# Patient Record
Sex: MALE | Race: WHITE | NOT HISPANIC OR LATINO | URBAN - METROPOLITAN AREA
[De-identification: names, ages, dates, MRNs, and addresses within clinical notes are randomized per-mention and may not be internally consistent; named-entity substitution may affect disease eponyms.]

---

## 2017-08-03 ENCOUNTER — OUTPATIENT (OUTPATIENT)
Dept: OUTPATIENT SERVICES | Facility: HOSPITAL | Age: 72
LOS: 1 days | End: 2017-08-03
Payer: MEDICARE

## 2017-08-03 VITALS
HEIGHT: 71 IN | WEIGHT: 199.96 LBS | TEMPERATURE: 98 F | DIASTOLIC BLOOD PRESSURE: 72 MMHG | RESPIRATION RATE: 14 BRPM | SYSTOLIC BLOOD PRESSURE: 150 MMHG | HEART RATE: 75 BPM | OXYGEN SATURATION: 97 %

## 2017-08-03 DIAGNOSIS — Z01.818 ENCOUNTER FOR OTHER PREPROCEDURAL EXAMINATION: ICD-10-CM

## 2017-08-03 DIAGNOSIS — Z90.89 ACQUIRED ABSENCE OF OTHER ORGANS: Chronic | ICD-10-CM

## 2017-08-03 DIAGNOSIS — M17.11 UNILATERAL PRIMARY OSTEOARTHRITIS, RIGHT KNEE: ICD-10-CM

## 2017-08-03 DIAGNOSIS — Z98.890 OTHER SPECIFIED POSTPROCEDURAL STATES: Chronic | ICD-10-CM

## 2017-08-03 LAB
ALBUMIN SERPL ELPH-MCNC: 4 G/DL — SIGNIFICANT CHANGE UP (ref 3.3–5)
ALP SERPL-CCNC: 52 U/L — SIGNIFICANT CHANGE UP (ref 30–120)
ALT FLD-CCNC: 29 U/L DA — SIGNIFICANT CHANGE UP (ref 10–60)
ANION GAP SERPL CALC-SCNC: 9 MMOL/L — SIGNIFICANT CHANGE UP (ref 5–17)
APTT BLD: 30.6 SEC — SIGNIFICANT CHANGE UP (ref 27.5–37.4)
AST SERPL-CCNC: 20 U/L — SIGNIFICANT CHANGE UP (ref 10–40)
BILIRUB SERPL-MCNC: 0.5 MG/DL — SIGNIFICANT CHANGE UP (ref 0.2–1.2)
BLD GP AB SCN SERPL QL: SIGNIFICANT CHANGE UP
BUN SERPL-MCNC: 33 MG/DL — HIGH (ref 7–23)
CALCIUM SERPL-MCNC: 9.3 MG/DL — SIGNIFICANT CHANGE UP (ref 8.4–10.5)
CHLORIDE SERPL-SCNC: 105 MMOL/L — SIGNIFICANT CHANGE UP (ref 96–108)
CO2 SERPL-SCNC: 27 MMOL/L — SIGNIFICANT CHANGE UP (ref 22–31)
CREAT SERPL-MCNC: 0.91 MG/DL — SIGNIFICANT CHANGE UP (ref 0.5–1.3)
GLUCOSE SERPL-MCNC: 121 MG/DL — HIGH (ref 70–99)
HCT VFR BLD CALC: 38.3 % — LOW (ref 39–50)
HGB BLD-MCNC: 12.8 G/DL — LOW (ref 13–17)
INR BLD: 1.04 RATIO — SIGNIFICANT CHANGE UP (ref 0.88–1.16)
MCHC RBC-ENTMCNC: 29.8 PG — SIGNIFICANT CHANGE UP (ref 27–34)
MCHC RBC-ENTMCNC: 33.3 GM/DL — SIGNIFICANT CHANGE UP (ref 32–36)
MCV RBC AUTO: 89.5 FL — SIGNIFICANT CHANGE UP (ref 80–100)
PLATELET # BLD AUTO: 183 K/UL — SIGNIFICANT CHANGE UP (ref 150–400)
POTASSIUM SERPL-MCNC: 3.7 MMOL/L — SIGNIFICANT CHANGE UP (ref 3.5–5.3)
POTASSIUM SERPL-SCNC: 3.7 MMOL/L — SIGNIFICANT CHANGE UP (ref 3.5–5.3)
PROT SERPL-MCNC: 7.4 G/DL — SIGNIFICANT CHANGE UP (ref 6–8.3)
PROTHROM AB SERPL-ACNC: 11.4 SEC — SIGNIFICANT CHANGE UP (ref 9.8–12.7)
RBC # BLD: 4.28 M/UL — SIGNIFICANT CHANGE UP (ref 4.2–5.8)
RBC # FLD: 12.1 % — SIGNIFICANT CHANGE UP (ref 10.3–14.5)
SODIUM SERPL-SCNC: 141 MMOL/L — SIGNIFICANT CHANGE UP (ref 135–145)
WBC # BLD: 6.4 K/UL — SIGNIFICANT CHANGE UP (ref 3.8–10.5)
WBC # FLD AUTO: 6.4 K/UL — SIGNIFICANT CHANGE UP (ref 3.8–10.5)

## 2017-08-03 PROCEDURE — 85610 PROTHROMBIN TIME: CPT

## 2017-08-03 PROCEDURE — 87641 MR-STAPH DNA AMP PROBE: CPT

## 2017-08-03 PROCEDURE — 86900 BLOOD TYPING SEROLOGIC ABO: CPT

## 2017-08-03 PROCEDURE — 85027 COMPLETE CBC AUTOMATED: CPT

## 2017-08-03 PROCEDURE — 86901 BLOOD TYPING SEROLOGIC RH(D): CPT

## 2017-08-03 PROCEDURE — 93010 ELECTROCARDIOGRAM REPORT: CPT | Mod: NC

## 2017-08-03 PROCEDURE — 87640 STAPH A DNA AMP PROBE: CPT

## 2017-08-03 PROCEDURE — 36415 COLL VENOUS BLD VENIPUNCTURE: CPT

## 2017-08-03 PROCEDURE — 93005 ELECTROCARDIOGRAM TRACING: CPT

## 2017-08-03 PROCEDURE — 80053 COMPREHEN METABOLIC PANEL: CPT

## 2017-08-03 PROCEDURE — 86850 RBC ANTIBODY SCREEN: CPT

## 2017-08-03 PROCEDURE — G0463: CPT

## 2017-08-03 PROCEDURE — 85730 THROMBOPLASTIN TIME PARTIAL: CPT

## 2017-08-03 NOTE — H&P PST ADULT - PSH
H/O left knee surgery  2007  History of hemorrhoidectomy  2012  History of tonsillectomy  childhood  Hx of arthroscopy of shoulder  left, 2003

## 2017-08-03 NOTE — H&P PST ADULT - PMH
BPH (benign prostatic hyperplasia)    Dyslipidemia    GERD (gastroesophageal reflux disease)    Hypertension    Osteoarthritis of right knee

## 2017-08-03 NOTE — H&P PST ADULT - NEGATIVE ENMT SYMPTOMS
no throat pain/no nasal discharge/no dry mouth/no tinnitus/no ear pain/no sinus symptoms/no nasal obstruction/no nasal congestion/no vertigo/no post-nasal discharge/no dysphagia/no gum bleeding/no hearing difficulty/no nose bleeds/no recurrent cold sores/no abnormal taste sensation

## 2017-08-03 NOTE — H&P PST ADULT - HISTORY OF PRESENT ILLNESS
73 yo male is scheduled for "RIGHT total knee replacement" on 8/22/17 with Lalito Rojas MD.  Patient complains of right knee pain for "many years" for which has tried HA injections without relief.  Pain is worst with stair climbing and weight bearing and rates 3/10.

## 2017-08-03 NOTE — H&P PST ADULT - NEGATIVE NEUROLOGICAL SYMPTOMS
no weakness/no loss of sensation/no tremors/no difficulty walking/no vertigo/no headache/no confusion

## 2017-08-03 NOTE — H&P PST ADULT - MUSCULOSKELETAL
details… detailed exam no joint warmth/no joint erythema/no calf tenderness/decreased ROM due to pain/no joint swelling

## 2017-08-03 NOTE — H&P PST ADULT - PROBLEM SELECTOR PLAN 1
"Right total knee replacement" on 8/22/17  Pre op instructions were reviewed and signed.  Patient will obtain medical and cardiology clearance.

## 2017-08-04 LAB
MRSA PCR RESULT.: SIGNIFICANT CHANGE UP
S AUREUS DNA NOSE QL NAA+PROBE: SIGNIFICANT CHANGE UP

## 2017-08-05 ENCOUNTER — TRANSCRIPTION ENCOUNTER (OUTPATIENT)
Age: 72
End: 2017-08-05

## 2017-08-21 RX ORDER — SENNA PLUS 8.6 MG/1
2 TABLET ORAL AT BEDTIME
Qty: 0 | Refills: 0 | Status: DISCONTINUED | OUTPATIENT
Start: 2017-08-22 | End: 2017-08-25

## 2017-08-21 RX ORDER — MAGNESIUM HYDROXIDE 400 MG/1
30 TABLET, CHEWABLE ORAL DAILY
Qty: 0 | Refills: 0 | Status: DISCONTINUED | OUTPATIENT
Start: 2017-08-22 | End: 2017-08-25

## 2017-08-21 RX ORDER — PANTOPRAZOLE SODIUM 20 MG/1
40 TABLET, DELAYED RELEASE ORAL
Qty: 0 | Refills: 0 | Status: DISCONTINUED | OUTPATIENT
Start: 2017-08-22 | End: 2017-08-25

## 2017-08-21 RX ORDER — POLYETHYLENE GLYCOL 3350 17 G/17G
17 POWDER, FOR SOLUTION ORAL DAILY
Qty: 0 | Refills: 0 | Status: DISCONTINUED | OUTPATIENT
Start: 2017-08-22 | End: 2017-08-25

## 2017-08-21 RX ORDER — DOCUSATE SODIUM 100 MG
100 CAPSULE ORAL THREE TIMES A DAY
Qty: 0 | Refills: 0 | Status: DISCONTINUED | OUTPATIENT
Start: 2017-08-22 | End: 2017-08-25

## 2017-08-21 RX ORDER — SODIUM CHLORIDE 9 MG/ML
1000 INJECTION, SOLUTION INTRAVENOUS
Qty: 0 | Refills: 0 | Status: DISCONTINUED | OUTPATIENT
Start: 2017-08-22 | End: 2017-08-25

## 2017-08-21 RX ORDER — ONDANSETRON 8 MG/1
4 TABLET, FILM COATED ORAL EVERY 6 HOURS
Qty: 0 | Refills: 0 | Status: DISCONTINUED | OUTPATIENT
Start: 2017-08-22 | End: 2017-08-25

## 2017-08-22 ENCOUNTER — RESULT REVIEW (OUTPATIENT)
Age: 72
End: 2017-08-22

## 2017-08-22 ENCOUNTER — INPATIENT (INPATIENT)
Facility: HOSPITAL | Age: 72
LOS: 2 days | Discharge: HOME | DRG: 470 | End: 2017-08-25
Attending: SPECIALIST | Admitting: SPECIALIST
Payer: MEDICARE

## 2017-08-22 ENCOUNTER — TRANSCRIPTION ENCOUNTER (OUTPATIENT)
Age: 72
End: 2017-08-22

## 2017-08-22 VITALS
SYSTOLIC BLOOD PRESSURE: 144 MMHG | HEIGHT: 71 IN | DIASTOLIC BLOOD PRESSURE: 71 MMHG | TEMPERATURE: 98 F | OXYGEN SATURATION: 98 % | HEART RATE: 71 BPM | WEIGHT: 197.09 LBS | RESPIRATION RATE: 15 BRPM

## 2017-08-22 DIAGNOSIS — M17.11 UNILATERAL PRIMARY OSTEOARTHRITIS, RIGHT KNEE: ICD-10-CM

## 2017-08-22 DIAGNOSIS — Z47.1 AFTERCARE FOLLOWING JOINT REPLACEMENT SURGERY: ICD-10-CM

## 2017-08-22 DIAGNOSIS — Z98.890 OTHER SPECIFIED POSTPROCEDURAL STATES: Chronic | ICD-10-CM

## 2017-08-22 DIAGNOSIS — Z01.818 ENCOUNTER FOR OTHER PREPROCEDURAL EXAMINATION: ICD-10-CM

## 2017-08-22 DIAGNOSIS — I10 ESSENTIAL (PRIMARY) HYPERTENSION: ICD-10-CM

## 2017-08-22 DIAGNOSIS — E78.5 HYPERLIPIDEMIA, UNSPECIFIED: ICD-10-CM

## 2017-08-22 DIAGNOSIS — K21.9 GASTRO-ESOPHAGEAL REFLUX DISEASE WITHOUT ESOPHAGITIS: ICD-10-CM

## 2017-08-22 DIAGNOSIS — Z90.89 ACQUIRED ABSENCE OF OTHER ORGANS: Chronic | ICD-10-CM

## 2017-08-22 LAB
ABO RH CONFIRMATION: SIGNIFICANT CHANGE UP
ANION GAP SERPL CALC-SCNC: 8 MMOL/L — SIGNIFICANT CHANGE UP (ref 5–17)
BUN SERPL-MCNC: 18 MG/DL — SIGNIFICANT CHANGE UP (ref 7–23)
CALCIUM SERPL-MCNC: 9 MG/DL — SIGNIFICANT CHANGE UP (ref 8.4–10.5)
CHLORIDE SERPL-SCNC: 107 MMOL/L — SIGNIFICANT CHANGE UP (ref 96–108)
CO2 SERPL-SCNC: 28 MMOL/L — SIGNIFICANT CHANGE UP (ref 22–31)
CREAT SERPL-MCNC: 0.76 MG/DL — SIGNIFICANT CHANGE UP (ref 0.5–1.3)
GLUCOSE SERPL-MCNC: 161 MG/DL — HIGH (ref 70–99)
HCT VFR BLD CALC: 36.4 % — LOW (ref 39–50)
HGB BLD-MCNC: 12.2 G/DL — LOW (ref 13–17)
POTASSIUM SERPL-MCNC: 3.4 MMOL/L — LOW (ref 3.5–5.3)
POTASSIUM SERPL-SCNC: 3.4 MMOL/L — LOW (ref 3.5–5.3)
SODIUM SERPL-SCNC: 143 MMOL/L — SIGNIFICANT CHANGE UP (ref 135–145)

## 2017-08-22 PROCEDURE — 88305 TISSUE EXAM BY PATHOLOGIST: CPT | Mod: 26

## 2017-08-22 PROCEDURE — 73562 X-RAY EXAM OF KNEE 3: CPT | Mod: 26,RT

## 2017-08-22 PROCEDURE — 99223 1ST HOSP IP/OBS HIGH 75: CPT

## 2017-08-22 PROCEDURE — 88311 DECALCIFY TISSUE: CPT | Mod: 26

## 2017-08-22 RX ORDER — CARVEDILOL PHOSPHATE 80 MG/1
40 CAPSULE, EXTENDED RELEASE ORAL DAILY
Qty: 0 | Refills: 0 | Status: DISCONTINUED | OUTPATIENT
Start: 2017-08-23 | End: 2017-08-25

## 2017-08-22 RX ORDER — CEFAZOLIN SODIUM 1 G
2000 VIAL (EA) INJECTION ONCE
Qty: 0 | Refills: 0 | Status: COMPLETED | OUTPATIENT
Start: 2017-08-22 | End: 2017-08-22

## 2017-08-22 RX ORDER — SODIUM CHLORIDE 9 MG/ML
1000 INJECTION, SOLUTION INTRAVENOUS
Qty: 0 | Refills: 0 | Status: DISCONTINUED | OUTPATIENT
Start: 2017-08-22 | End: 2017-08-22

## 2017-08-22 RX ORDER — ACETAMINOPHEN 500 MG
1000 TABLET ORAL EVERY 8 HOURS
Qty: 0 | Refills: 0 | Status: DISCONTINUED | OUTPATIENT
Start: 2017-08-23 | End: 2017-08-25

## 2017-08-22 RX ORDER — HYDRALAZINE HCL 50 MG
50 TABLET ORAL
Qty: 0 | Refills: 0 | Status: DISCONTINUED | OUTPATIENT
Start: 2017-08-22 | End: 2017-08-25

## 2017-08-22 RX ORDER — HYDROMORPHONE HYDROCHLORIDE 2 MG/ML
0.5 INJECTION INTRAMUSCULAR; INTRAVENOUS; SUBCUTANEOUS
Qty: 0 | Refills: 0 | Status: DISCONTINUED | OUTPATIENT
Start: 2017-08-22 | End: 2017-08-25

## 2017-08-22 RX ORDER — OXYCODONE HYDROCHLORIDE 5 MG/1
5 TABLET ORAL
Qty: 0 | Refills: 0 | Status: DISCONTINUED | OUTPATIENT
Start: 2017-08-22 | End: 2017-08-25

## 2017-08-22 RX ORDER — NIFEDIPINE 30 MG
60 TABLET, EXTENDED RELEASE 24 HR ORAL DAILY
Qty: 0 | Refills: 0 | Status: DISCONTINUED | OUTPATIENT
Start: 2017-08-22 | End: 2017-08-22

## 2017-08-22 RX ORDER — HYDROMORPHONE HYDROCHLORIDE 2 MG/ML
0.5 INJECTION INTRAMUSCULAR; INTRAVENOUS; SUBCUTANEOUS
Qty: 0 | Refills: 0 | Status: DISCONTINUED | OUTPATIENT
Start: 2017-08-22 | End: 2017-08-22

## 2017-08-22 RX ORDER — TRIAMTERENE/HYDROCHLOROTHIAZID 75 MG-50MG
1 TABLET ORAL DAILY
Qty: 0 | Refills: 0 | Status: DISCONTINUED | OUTPATIENT
Start: 2017-08-24 | End: 2017-08-25

## 2017-08-22 RX ORDER — ACETAMINOPHEN 500 MG
1000 TABLET ORAL ONCE
Qty: 0 | Refills: 0 | Status: COMPLETED | OUTPATIENT
Start: 2017-08-22 | End: 2017-08-22

## 2017-08-22 RX ORDER — ASPIRIN/CALCIUM CARB/MAGNESIUM 324 MG
162 TABLET ORAL EVERY 12 HOURS
Qty: 0 | Refills: 0 | Status: DISCONTINUED | OUTPATIENT
Start: 2017-08-23 | End: 2017-08-25

## 2017-08-22 RX ORDER — APREPITANT 80 MG/1
40 CAPSULE ORAL ONCE
Qty: 0 | Refills: 0 | Status: COMPLETED | OUTPATIENT
Start: 2017-08-22 | End: 2017-08-22

## 2017-08-22 RX ORDER — NIFEDIPINE 30 MG
60 TABLET, EXTENDED RELEASE 24 HR ORAL EVERY 12 HOURS
Qty: 0 | Refills: 0 | Status: DISCONTINUED | OUTPATIENT
Start: 2017-08-22 | End: 2017-08-25

## 2017-08-22 RX ORDER — CELECOXIB 200 MG/1
200 CAPSULE ORAL
Qty: 0 | Refills: 0 | Status: DISCONTINUED | OUTPATIENT
Start: 2017-08-22 | End: 2017-08-25

## 2017-08-22 RX ORDER — TRANEXAMIC ACID 100 MG/ML
1000 INJECTION, SOLUTION INTRAVENOUS ONCE
Qty: 0 | Refills: 0 | Status: COMPLETED | OUTPATIENT
Start: 2017-08-22 | End: 2017-08-22

## 2017-08-22 RX ORDER — OXYCODONE HYDROCHLORIDE 5 MG/1
10 TABLET ORAL
Qty: 0 | Refills: 0 | Status: DISCONTINUED | OUTPATIENT
Start: 2017-08-22 | End: 2017-08-25

## 2017-08-22 RX ORDER — DOXAZOSIN MESYLATE 4 MG
4 TABLET ORAL AT BEDTIME
Qty: 0 | Refills: 0 | Status: DISCONTINUED | OUTPATIENT
Start: 2017-08-22 | End: 2017-08-25

## 2017-08-22 RX ORDER — ATORVASTATIN CALCIUM 80 MG/1
40 TABLET, FILM COATED ORAL AT BEDTIME
Qty: 0 | Refills: 0 | Status: DISCONTINUED | OUTPATIENT
Start: 2017-08-22 | End: 2017-08-25

## 2017-08-22 RX ORDER — CELECOXIB 200 MG/1
1 CAPSULE ORAL
Qty: 38 | Refills: 0 | OUTPATIENT
Start: 2017-08-22 | End: 2017-09-10

## 2017-08-22 RX ORDER — CELECOXIB 200 MG/1
200 CAPSULE ORAL ONCE
Qty: 0 | Refills: 0 | Status: COMPLETED | OUTPATIENT
Start: 2017-08-22 | End: 2017-08-22

## 2017-08-22 RX ORDER — CEFAZOLIN SODIUM 1 G
2000 VIAL (EA) INJECTION EVERY 8 HOURS
Qty: 0 | Refills: 0 | Status: COMPLETED | OUTPATIENT
Start: 2017-08-22 | End: 2017-08-23

## 2017-08-22 RX ORDER — ACETAMINOPHEN 500 MG
1000 TABLET ORAL EVERY 6 HOURS
Qty: 0 | Refills: 0 | Status: COMPLETED | OUTPATIENT
Start: 2017-08-22 | End: 2017-08-23

## 2017-08-22 RX ORDER — VALSARTAN 80 MG/1
320 TABLET ORAL DAILY
Qty: 0 | Refills: 0 | Status: DISCONTINUED | OUTPATIENT
Start: 2017-08-24 | End: 2017-08-25

## 2017-08-22 RX ADMIN — Medication 1000 MILLIGRAM(S): at 22:00

## 2017-08-22 RX ADMIN — CELECOXIB 200 MILLIGRAM(S): 200 CAPSULE ORAL at 07:25

## 2017-08-22 RX ADMIN — SODIUM CHLORIDE 75 MILLILITER(S): 9 INJECTION, SOLUTION INTRAVENOUS at 16:06

## 2017-08-22 RX ADMIN — Medication 400 MILLIGRAM(S): at 16:06

## 2017-08-22 RX ADMIN — CELECOXIB 200 MILLIGRAM(S): 200 CAPSULE ORAL at 18:08

## 2017-08-22 RX ADMIN — Medication 100 MILLIGRAM(S): at 16:31

## 2017-08-22 RX ADMIN — SODIUM CHLORIDE 75 MILLILITER(S): 9 INJECTION, SOLUTION INTRAVENOUS at 21:19

## 2017-08-22 RX ADMIN — CELECOXIB 200 MILLIGRAM(S): 200 CAPSULE ORAL at 17:39

## 2017-08-22 RX ADMIN — Medication 4 MILLIGRAM(S): at 21:19

## 2017-08-22 RX ADMIN — Medication 400 MILLIGRAM(S): at 21:19

## 2017-08-22 RX ADMIN — HYDROMORPHONE HYDROCHLORIDE 0.5 MILLIGRAM(S): 2 INJECTION INTRAMUSCULAR; INTRAVENOUS; SUBCUTANEOUS at 10:39

## 2017-08-22 RX ADMIN — Medication 50 MILLIGRAM(S): at 17:39

## 2017-08-22 RX ADMIN — OXYCODONE HYDROCHLORIDE 5 MILLIGRAM(S): 5 TABLET ORAL at 18:12

## 2017-08-22 RX ADMIN — Medication 1000 MILLIGRAM(S): at 16:40

## 2017-08-22 RX ADMIN — ATORVASTATIN CALCIUM 40 MILLIGRAM(S): 80 TABLET, FILM COATED ORAL at 21:19

## 2017-08-22 RX ADMIN — Medication 60 MILLIGRAM(S): at 21:19

## 2017-08-22 RX ADMIN — APREPITANT 40 MILLIGRAM(S): 80 CAPSULE ORAL at 07:25

## 2017-08-22 NOTE — CONSULT NOTE ADULT - SUBJECTIVE AND OBJECTIVE BOX
Patient is a 72y old  Male who presents with a chief complaint of "Dr Rojas is going to replace my right knee" (21 Aug 2017 15:50)      HPI:  Pt is seen and examined.    PAST MEDICAL & SURGICAL HISTORY:  BPH (benign prostatic hyperplasia)  Osteoarthritis of right knee  Dyslipidemia  GERD (gastroesophageal reflux disease)  Hypertension  History of tonsillectomy: childhood  History of hemorrhoidectomy: 2012  Hx of arthroscopy of shoulder: left, 2003  H/O left knee surgery: 2007      MEDICATIONS  (STANDING):  lactated ringers. 1000 milliLiter(s) (75 mL/Hr) IV Continuous <Continuous>  pantoprazole    Tablet 40 milliGRAM(s) Oral before breakfast  senna 2 Tablet(s) Oral at bedtime  docusate sodium 100 milliGRAM(s) Oral three times a day  celecoxib 200 milliGRAM(s) Oral two times a day with meals  doxazosin 4 milliGRAM(s) Oral at bedtime  atorvastatin 40 milliGRAM(s) Oral at bedtime  NIFEdipine XL 60 milliGRAM(s) Oral daily  hydrALAZINE 50 milliGRAM(s) Oral two times a day  ceFAZolin   IVPB 2000 milliGRAM(s) IV Intermittent every 8 hours  acetaminophen  IVPB. 1000 milliGRAM(s) IV Intermittent every 6 hours  carvedilol CR 40 milliGRAM(s) Oral daily    MEDICATIONS  (PRN):  aluminum hydroxide/magnesium hydroxide/simethicone Suspension 30 milliLiter(s) Oral four times a day PRN Indigestion  ondansetron Injectable 4 milliGRAM(s) IV Push every 6 hours PRN Nausea and/or Vomiting  magnesium hydroxide Suspension 30 milliLiter(s) Oral daily PRN Constipation  polyethylene glycol 3350 17 Gram(s) Oral daily PRN Constipation  oxyCODONE    IR 5 milliGRAM(s) Oral every 3 hours PRN Mild Pain  oxyCODONE    IR 10 milliGRAM(s) Oral every 3 hours PRN Moderate Pain  HYDROmorphone  Injectable 0.5 milliGRAM(s) IV Push every 3 hours PRN Severe Pain      Allergies    No Known Allergies    Intolerances          SOCIAL HISTORY:  Smoker:  YES / NO        PACK YEARS:                         WHEN QUIT?  ETOH use:  YES / NO               FREQUENCY / QUANTITY:  Ilicit Drug use:  YES / NO  Occupation:  Assisted device use (Cane / Walker):  Live with:      FAMILY HISTORY:  Family history of heart disease (Father)      Vital Signs Last 24 Hrs  T(C): 36.8 (22 Aug 2017 12:27), Max: 36.8 (22 Aug 2017 12:27)  T(F): 98.3 (22 Aug 2017 12:27), Max: 98.3 (22 Aug 2017 12:27)  HR: 68 (22 Aug 2017 12:27) (55 - 71)  BP: 120/61 (22 Aug 2017 12:27) (86/62 - 156/78)  BP(mean): --  RR: 16 (22 Aug 2017 12:27) (9 - 16)  SpO2: 96% (22 Aug 2017 12:27) (96% - 100%)            LABS:              CAPILLARY BLOOD GLUCOSE          RADIOLOGY & ADDITIONAL STUDIES: Patient is a 72y old  Male who presents with a chief complaint of "Dr Rojas is going to replace my right knee" (21 Aug 2017 15:50)      HPI:  Pt is seen and examined.    PAST MEDICAL & SURGICAL HISTORY:  BPH (benign prostatic hyperplasia)  Osteoarthritis of right knee  Dyslipidemia  GERD (gastroesophageal reflux disease)  Hypertension  History of tonsillectomy: childhood  History of hemorrhoidectomy: 2012  Hx of arthroscopy of shoulder: left, 2003  H/O left knee surgery: 2007      MEDICATIONS  (STANDING):  lactated ringers. 1000 milliLiter(s) (75 mL/Hr) IV Continuous <Continuous>  pantoprazole    Tablet 40 milliGRAM(s) Oral before breakfast  senna 2 Tablet(s) Oral at bedtime  docusate sodium 100 milliGRAM(s) Oral three times a day  celecoxib 200 milliGRAM(s) Oral two times a day with meals  doxazosin 4 milliGRAM(s) Oral at bedtime  atorvastatin 40 milliGRAM(s) Oral at bedtime  NIFEdipine XL 60 milliGRAM(s) Oral daily  hydrALAZINE 50 milliGRAM(s) Oral two times a day  ceFAZolin   IVPB 2000 milliGRAM(s) IV Intermittent every 8 hours  acetaminophen  IVPB. 1000 milliGRAM(s) IV Intermittent every 6 hours  carvedilol CR 40 milliGRAM(s) Oral daily    MEDICATIONS  (PRN):  aluminum hydroxide/magnesium hydroxide/simethicone Suspension 30 milliLiter(s) Oral four times a day PRN Indigestion  ondansetron Injectable 4 milliGRAM(s) IV Push every 6 hours PRN Nausea and/or Vomiting  magnesium hydroxide Suspension 30 milliLiter(s) Oral daily PRN Constipation  polyethylene glycol 3350 17 Gram(s) Oral daily PRN Constipation  oxyCODONE    IR 5 milliGRAM(s) Oral every 3 hours PRN Mild Pain  oxyCODONE    IR 10 milliGRAM(s) Oral every 3 hours PRN Moderate Pain  HYDROmorphone  Injectable 0.5 milliGRAM(s) IV Push every 3 hours PRN Severe Pain      Allergies    No Known Allergies    Intolerances          SOCIAL HISTORY:  Smoker:  YES / NO        PACK YEARS:                         WHEN QUIT?  ETOH use:  YES / NO               FREQUENCY / QUANTITY:  Ilicit Drug use:  YES / NO  Occupation:  Assisted device use (Cane / Walker):  Live with:      FAMILY HISTORY:  Family history of heart disease (Father)      Vital Signs Last 24 Hrs  T(C): 36.8 (22 Aug 2017 12:27), Max: 36.8 (22 Aug 2017 12:27)  T(F): 98.3 (22 Aug 2017 12:27), Max: 98.3 (22 Aug 2017 12:27)  HR: 68 (22 Aug 2017 12:27) (55 - 71)  BP: 120/61 (22 Aug 2017 12:27) (86/62 - 156/78)  BP(mean): --  RR: 16 (22 Aug 2017 12:27) (9 - 16)  SpO2: 96% (22 Aug 2017 12:27) (96% - 100%)

## 2017-08-22 NOTE — CONSULT NOTE ADULT - PROBLEM SELECTOR RECOMMENDATION 9
pain meds as-oxycodone.  PT/OT.  DVT prophylaxis.  DVT ppx: [ ]ASA81 [ x] WXA202 [ ] Lovenox [ ] Coumadin   [ ] Eliquis [  ] Heparin  Dispo:     Home [ ]     Acute Rehab [ ]     SANDI [ ]     TBD [x ]

## 2017-08-22 NOTE — PHYSICAL THERAPY INITIAL EVALUATION ADULT - GAIT TRAINING, PT EVAL
Goals 2-4 days, Pt will ambulate 150 ft w/ rolling walker independently.     Pt will negotiate 10 steps with rail and straight cane with supervision

## 2017-08-22 NOTE — PHYSICAL THERAPY INITIAL EVALUATION ADULT - ADDITIONAL COMMENTS
Pt lives with wife in a house w/ 5 steps to enter with rail and 15 steps inside with rail.  Pt has no DME

## 2017-08-22 NOTE — OCCUPATIONAL THERAPY INITIAL EVALUATION ADULT - ADDITIONAL COMMENTS
Pt lives in  a private house 5 [address] + railing and 15 inside + railing + stall shower with built in seat and grab bar. Pt has a grab bar by toilet

## 2017-08-22 NOTE — PROGRESS NOTE ADULT - SUBJECTIVE AND OBJECTIVE BOX
Post Op     VAL GOODMAN      72y        Male                                                                                                                 T(C): 36.8 (08-22-17 @ 12:27), Max: 36.8 (08-22-17 @ 12:27)  HR: 68 (08-22-17 @ 12:27) (55 - 71)  BP: 120/61 (08-22-17 @ 12:27) (86/62 - 156/78)  RR: 16 (08-22-17 @ 12:27) (9 - 16)  SpO2: 96% (08-22-17 @ 12:27) (96% - 100%)      S/P  total knee replacement    Patient denies shortness of breath, chest pain, dyspnea, No complaints  Pain is 3 /10    Physical Exam    Extremity: Bilaterally:  No holmon                                           No Cord                                          PAS on                                          Neurovascular intact                                          Motor intact EHL/FHL                                          Sensation intact                                          Pulses intact DP/PT                                         Calves Soft                                         Dressing Clean / Dry / Intact                                         Capillary refill with 5 seconds                A/P  -- S/P total knee replacement    -  Medicine To Follow   - DVT prophylaxis PAS / aspirin   - PT & OT   - Analgesia  - Incentive Spirometry  - Discharge Planning  - Safety Precautions  -  CBC , BMP daily

## 2017-08-22 NOTE — PHYSICAL THERAPY INITIAL EVALUATION ADULT - RANGE OF MOTION EXAMINATION, REHAB EVAL
Left LE ROM was WFL (within functional limits)/R knee 0-60 deg/bilateral upper extremity ROM was WFL (within functional limits)/deficits as listed below

## 2017-08-22 NOTE — PHYSICAL THERAPY INITIAL EVALUATION ADULT - GAIT DEVIATIONS NOTED, PT EVAL
decreased nhi/decreased velocity of limb motion/decreased weight-shifting ability/decreased step length

## 2017-08-23 ENCOUNTER — TRANSCRIPTION ENCOUNTER (OUTPATIENT)
Age: 72
End: 2017-08-23

## 2017-08-23 DIAGNOSIS — D69.6 THROMBOCYTOPENIA, UNSPECIFIED: ICD-10-CM

## 2017-08-23 DIAGNOSIS — D72.828 OTHER ELEVATED WHITE BLOOD CELL COUNT: ICD-10-CM

## 2017-08-23 DIAGNOSIS — D50.0 IRON DEFICIENCY ANEMIA SECONDARY TO BLOOD LOSS (CHRONIC): ICD-10-CM

## 2017-08-23 LAB
ANION GAP SERPL CALC-SCNC: 9 MMOL/L — SIGNIFICANT CHANGE UP (ref 5–17)
BUN SERPL-MCNC: 15 MG/DL — SIGNIFICANT CHANGE UP (ref 7–23)
CALCIUM SERPL-MCNC: 8.6 MG/DL — SIGNIFICANT CHANGE UP (ref 8.4–10.5)
CHLORIDE SERPL-SCNC: 109 MMOL/L — HIGH (ref 96–108)
CO2 SERPL-SCNC: 25 MMOL/L — SIGNIFICANT CHANGE UP (ref 22–31)
CREAT SERPL-MCNC: 0.84 MG/DL — SIGNIFICANT CHANGE UP (ref 0.5–1.3)
GLUCOSE SERPL-MCNC: 147 MG/DL — HIGH (ref 70–99)
HCT VFR BLD CALC: 31.9 % — LOW (ref 39–50)
HGB BLD-MCNC: 10.9 G/DL — LOW (ref 13–17)
MCHC RBC-ENTMCNC: 32.1 PG — SIGNIFICANT CHANGE UP (ref 27–34)
MCHC RBC-ENTMCNC: 34.2 GM/DL — SIGNIFICANT CHANGE UP (ref 32–36)
MCV RBC AUTO: 93.7 FL — SIGNIFICANT CHANGE UP (ref 80–100)
PLATELET # BLD AUTO: 144 K/UL — LOW (ref 150–400)
POTASSIUM SERPL-MCNC: 3.5 MMOL/L — SIGNIFICANT CHANGE UP (ref 3.5–5.3)
POTASSIUM SERPL-SCNC: 3.5 MMOL/L — SIGNIFICANT CHANGE UP (ref 3.5–5.3)
RBC # BLD: 3.4 M/UL — LOW (ref 4.2–5.8)
RBC # FLD: 12.2 % — SIGNIFICANT CHANGE UP (ref 10.3–14.5)
SODIUM SERPL-SCNC: 143 MMOL/L — SIGNIFICANT CHANGE UP (ref 135–145)
WBC # BLD: 12.2 K/UL — HIGH (ref 3.8–10.5)
WBC # FLD AUTO: 12.2 K/UL — HIGH (ref 3.8–10.5)

## 2017-08-23 PROCEDURE — 99233 SBSQ HOSP IP/OBS HIGH 50: CPT

## 2017-08-23 RX ORDER — POTASSIUM CHLORIDE 20 MEQ
20 PACKET (EA) ORAL ONCE
Qty: 0 | Refills: 0 | Status: COMPLETED | OUTPATIENT
Start: 2017-08-23 | End: 2017-08-23

## 2017-08-23 RX ADMIN — Medication 60 MILLIGRAM(S): at 05:07

## 2017-08-23 RX ADMIN — Medication 1000 MILLIGRAM(S): at 05:24

## 2017-08-23 RX ADMIN — Medication 162 MILLIGRAM(S): at 21:14

## 2017-08-23 RX ADMIN — PANTOPRAZOLE SODIUM 40 MILLIGRAM(S): 20 TABLET, DELAYED RELEASE ORAL at 05:07

## 2017-08-23 RX ADMIN — OXYCODONE HYDROCHLORIDE 5 MILLIGRAM(S): 5 TABLET ORAL at 05:07

## 2017-08-23 RX ADMIN — Medication 100 MILLIGRAM(S): at 14:17

## 2017-08-23 RX ADMIN — Medication 1000 MILLIGRAM(S): at 10:37

## 2017-08-23 RX ADMIN — CELECOXIB 200 MILLIGRAM(S): 200 CAPSULE ORAL at 17:30

## 2017-08-23 RX ADMIN — Medication 1000 MILLIGRAM(S): at 11:00

## 2017-08-23 RX ADMIN — Medication 1000 MILLIGRAM(S): at 17:30

## 2017-08-23 RX ADMIN — CELECOXIB 200 MILLIGRAM(S): 200 CAPSULE ORAL at 09:05

## 2017-08-23 RX ADMIN — Medication 50 MILLIGRAM(S): at 17:31

## 2017-08-23 RX ADMIN — Medication 50 MILLIGRAM(S): at 05:07

## 2017-08-23 RX ADMIN — CELECOXIB 200 MILLIGRAM(S): 200 CAPSULE ORAL at 08:25

## 2017-08-23 RX ADMIN — Medication 100 MILLIGRAM(S): at 08:25

## 2017-08-23 RX ADMIN — CELECOXIB 200 MILLIGRAM(S): 200 CAPSULE ORAL at 18:00

## 2017-08-23 RX ADMIN — Medication 60 MILLIGRAM(S): at 17:31

## 2017-08-23 RX ADMIN — Medication 162 MILLIGRAM(S): at 08:25

## 2017-08-23 RX ADMIN — Medication 400 MILLIGRAM(S): at 05:06

## 2017-08-23 RX ADMIN — OXYCODONE HYDROCHLORIDE 5 MILLIGRAM(S): 5 TABLET ORAL at 18:24

## 2017-08-23 RX ADMIN — Medication 100 MILLIGRAM(S): at 00:21

## 2017-08-23 RX ADMIN — OXYCODONE HYDROCHLORIDE 5 MILLIGRAM(S): 5 TABLET ORAL at 05:55

## 2017-08-23 RX ADMIN — ATORVASTATIN CALCIUM 40 MILLIGRAM(S): 80 TABLET, FILM COATED ORAL at 21:15

## 2017-08-23 RX ADMIN — SENNA PLUS 2 TABLET(S): 8.6 TABLET ORAL at 21:16

## 2017-08-23 RX ADMIN — Medication 1000 MILLIGRAM(S): at 18:00

## 2017-08-23 RX ADMIN — Medication 4 MILLIGRAM(S): at 21:15

## 2017-08-23 RX ADMIN — Medication 100 MILLIGRAM(S): at 21:15

## 2017-08-23 RX ADMIN — Medication 20 MILLIEQUIVALENT(S): at 08:54

## 2017-08-23 NOTE — DISCHARGE NOTE ADULT - DURABLE MEDICAL EQUIPMENT AGENCY
[facility]  Tel.# [phone] for DME for Rolling Walker and CPM Machine

## 2017-08-23 NOTE — DISCHARGE NOTE ADULT - CARE PLAN
Principal Discharge DX:	Aftercare following right knee joint replacement surgery  Goal:	Improve ambulation, ADLs and quality of life  Instructions for follow-up, activity and diet:	Physical Therapy/Occupational Therapy for: ambulation, transfers, stairs, ADL's (activities of daily living), range of motion exercises, and isometrics  -Activity  • Weight Bearing as tolerated with rolling walker.  • Take short, frequent walks increasing the distance that you walk each day as tolerated.  • Change your position every hour to decrease pain and stiffness.  • Continue the exercises taught to you by your physical therapist.  • No driving until cleared by the doctor.  • No tub baths, hot tubs, or swimming pools until instructed by your doctor.  • Do not squat down on the floor.• Do not kneel or twist your knee.  • Range of Motion Goals: Flexion= 120 degrees, Extension = 0 degrees  Keep incision clean and dry. May shower 5 days after surgery if no drainage from incision.  Prineo removal 2 weeks after surgery at Surgeon's office.  - Use CPM 2 hours 2 times a day- Start daily @ 6AM  - Advance 5-10degrees each session as tolerated  to goal 120 degrees  - Consider Pain meds prior to CPM-  Apply ice to knee Post CPM  Instructions for follow-up, activity and diet:	- Call your doctor if you experience:  • An increase in pain not controlled by pain medication or change in activity or  position.  • Temperature greater than 101° F.  • Redness, increased swelling or foul smelling drainage from or around the  incision.  • Numbness, tingling or a change in color or temperature of the operative leg.  • Call your doctor immediately if you experience chest pain, shortness of breath or calf pain. Principal Discharge DX:	Aftercare following right knee joint replacement surgery  Goal:	Improve ambulation, ADLs and quality of life  Instructions for follow-up, activity and diet:	Physical Therapy/Occupational Therapy for: ambulation, transfers, stairs, ADL's (activities of daily living), range of motion exercises, and isometrics  -Activity  • Weight Bearing as tolerated with rolling walker.  • Take short, frequent walks increasing the distance that you walk each day as tolerated.  • Change your position every hour to decrease pain and stiffness.  • Continue the exercises taught to you by your physical therapist.  • No driving until cleared by the doctor.  • No tub baths, hot tubs, or swimming pools until instructed by your doctor.  • Do not squat down on the floor.• Do not kneel or twist your knee.  • Range of Motion Goals: Flexion= 110 degrees, Extension = 0 degrees  Keep incision clean and dry. May shower  if no drainage from knee incision.  Prineo tape removal 2 weeks after surgery at Surgeon's office.  - Use CPM 2 hours 2 times a day- Start daily @ 6AM  - Advance 5-10degrees each session as tolerated  to goal 110 degrees  - Consider Pain meds prior to CPM-  Apply ice to knee after CPM use.  Instructions for follow-up, activity and diet:	- Call your doctor if you experience:  • An increase in pain not controlled by pain medication or change in activity or  position.  • Temperature greater than 101° F.  • Redness, increased swelling or foul smelling drainage from or around the  incision.  • Numbness, tingling or a change in color or temperature of the operative leg.  • Call your doctor immediately if you experience chest pain, shortness of breath or calf pain.

## 2017-08-23 NOTE — DISCHARGE NOTE ADULT - NS AS ACTIVITY OBS
No Heavy lifting/straining/Showering allowed/Stairs allowed/Walking-Outdoors allowed/Do not drive or operate machinery/Walking-Indoors allowed Walking-Outdoors allowed/Do not drive or operate machinery/Showering allowed/Stairs allowed/Walking-Indoors allowed/may shower when knee wound dry

## 2017-08-23 NOTE — PROGRESS NOTE ADULT - PROBLEM SELECTOR PLAN 1
oxycodone.  PT/OT.  DVT prophylaxis.  DVT ppx: [ ]ASA81 [ x] CUI709 [ ] Lovenox [ ] Coumadin   [ ] Eliquis [  ] Heparin  Dispo:     Home [ ]     Acute Rehab [ ]     SANDI [ ]     TBD [x ].

## 2017-08-23 NOTE — PROGRESS NOTE ADULT - SUBJECTIVE AND OBJECTIVE BOX
Discharge medication calendar:  ASA EC 162mg q12h x 6 weeks  APAP 1000mg q8h x 2-3 weeks  Celecoxib 200mg q12h x 2-3 weeks  Pantoprazole 40mg QAM x 6 weeks  Oxycodone PRN  Docusate 100mg TID while taking narcotic  Senna, bisacodyl, or Miralax PRN for treatment of constipation Discharge medication calendar:  ASA EC 162mg q12h x 6 weeks  APAP 1000mg q8h x 2-3 weeks  Celecoxib 200mg q12h x 2-3 weeks  Omeprazole 40mg QAM (home medication)  Oxycodone PRN  Docusate 100mg TID while taking narcotic  Senna, bisacodyl, or Miralax PRN for treatment of constipation

## 2017-08-23 NOTE — PROGRESS NOTE ADULT - SUBJECTIVE AND OBJECTIVE BOX
Patient is a 72y old  Male who presents with a chief complaint of "Dr Rojas is going to replace my right knee" (21 Aug 2017 15:50)      INTERVAL HPI/OVERNIGHT EVENTS:  Pt is seen and examined.  pain is controlled. No new complain.    Pain Location & Control:     MEDICATIONS  (STANDING):  lactated ringers. 1000 milliLiter(s) (75 mL/Hr) IV Continuous <Continuous>  pantoprazole    Tablet 40 milliGRAM(s) Oral before breakfast  senna 2 Tablet(s) Oral at bedtime  docusate sodium 100 milliGRAM(s) Oral three times a day  celecoxib 200 milliGRAM(s) Oral two times a day with meals  aspirin enteric coated 162 milliGRAM(s) Oral every 12 hours  doxazosin 4 milliGRAM(s) Oral at bedtime  atorvastatin 40 milliGRAM(s) Oral at bedtime  hydrALAZINE 50 milliGRAM(s) Oral two times a day  acetaminophen   Tablet. 1000 milliGRAM(s) Oral every 8 hours  carvedilol CR 40 milliGRAM(s) Oral daily  NIFEdipine XL 60 milliGRAM(s) Oral every 12 hours    MEDICATIONS  (PRN):  aluminum hydroxide/magnesium hydroxide/simethicone Suspension 30 milliLiter(s) Oral four times a day PRN Indigestion  ondansetron Injectable 4 milliGRAM(s) IV Push every 6 hours PRN Nausea and/or Vomiting  magnesium hydroxide Suspension 30 milliLiter(s) Oral daily PRN Constipation  polyethylene glycol 3350 17 Gram(s) Oral daily PRN Constipation  oxyCODONE    IR 5 milliGRAM(s) Oral every 3 hours PRN Mild Pain  oxyCODONE    IR 10 milliGRAM(s) Oral every 3 hours PRN Moderate Pain  HYDROmorphone  Injectable 0.5 milliGRAM(s) IV Push every 3 hours PRN Severe Pain      Allergies    No Known Allergies    Intolerances            Vital Signs Last 24 Hrs  T(C): 36.6 (23 Aug 2017 07:32), Max: 36.9 (22 Aug 2017 15:33)  T(F): 97.8 (23 Aug 2017 07:32), Max: 98.4 (22 Aug 2017 15:33)  HR: 64 (23 Aug 2017 07:32) (55 - 86)  BP: 125/67 (23 Aug 2017 07:32) (86/62 - 137/75)  BP(mean): --  RR: 16 (23 Aug 2017 07:32) (9 - 18)  SpO2: 98% (23 Aug 2017 07:32) (96% - 100%)        LABS:                        10.9   12.2  )-----------( 144      ( 23 Aug 2017 07:11 )             31.9     23 Aug 2017 07:11    143    |  109    |  15     ----------------------------<  147    3.5     |  25     |  0.84     Ca    8.6        23 Aug 2017 07:11            RADIOLOGY & ADDITIONAL TESTS:    Imaging Personally Reviewed:  [ ] YES  [ ] NO    Consultant(s) Notes Reviewed:  [ x] YES  [ ] NO    Care Discussed with Consultants/Other Providers [ x] YES  [ ] NO

## 2017-08-23 NOTE — DISCHARGE NOTE ADULT - PLAN OF CARE
Improve ambulation, ADLs and quality of life Physical Therapy/Occupational Therapy for: ambulation, transfers, stairs, ADL's (activities of daily living), range of motion exercises, and isometrics  -Activity  • Weight Bearing as tolerated with rolling walker.  • Take short, frequent walks increasing the distance that you walk each day as tolerated.  • Change your position every hour to decrease pain and stiffness.  • Continue the exercises taught to you by your physical therapist.  • No driving until cleared by the doctor.  • No tub baths, hot tubs, or swimming pools until instructed by your doctor.  • Do not squat down on the floor.• Do not kneel or twist your knee.  • Range of Motion Goals: Flexion= 120 degrees, Extension = 0 degrees  Keep incision clean and dry. May shower 5 days after surgery if no drainage from incision.  Prineo removal 2 weeks after surgery at Surgeon's office.  - Use CPM 2 hours 2 times a day- Start daily @ 6AM  - Advance 5-10degrees each session as tolerated  to goal 120 degrees  - Consider Pain meds prior to CPM-  Apply ice to knee Post CPM - Call your doctor if you experience:  • An increase in pain not controlled by pain medication or change in activity or  position.  • Temperature greater than 101° F.  • Redness, increased swelling or foul smelling drainage from or around the  incision.  • Numbness, tingling or a change in color or temperature of the operative leg.  • Call your doctor immediately if you experience chest pain, shortness of breath or calf pain. Physical Therapy/Occupational Therapy for: ambulation, transfers, stairs, ADL's (activities of daily living), range of motion exercises, and isometrics  -Activity  • Weight Bearing as tolerated with rolling walker.  • Take short, frequent walks increasing the distance that you walk each day as tolerated.  • Change your position every hour to decrease pain and stiffness.  • Continue the exercises taught to you by your physical therapist.  • No driving until cleared by the doctor.  • No tub baths, hot tubs, or swimming pools until instructed by your doctor.  • Do not squat down on the floor.• Do not kneel or twist your knee.  • Range of Motion Goals: Flexion= 110 degrees, Extension = 0 degrees  Keep incision clean and dry. May shower  if no drainage from knee incision.  Prineo tape removal 2 weeks after surgery at Surgeon's office.  - Use CPM 2 hours 2 times a day- Start daily @ 6AM  - Advance 5-10degrees each session as tolerated  to goal 110 degrees  - Consider Pain meds prior to CPM-  Apply ice to knee after CPM use.

## 2017-08-23 NOTE — PROGRESS NOTE ADULT - SUBJECTIVE AND OBJECTIVE BOX
ORTHOPEDIC PA PROGRESS NOTE  VAL GOODMAN      72y Male                                                                                                                               POD #        STATUS POST:   1            Pre-Op Dx: Primary osteoarthritis of right knee    Post-Op Dx:  Primary osteoarthritis of right knee    Procedure: Arthroplasty of right knee                                                  T(F): 97.8  HR: 64  BP: 125/67  RR: 16  SpO2: 98%                        10.9   12.2  )-----------( 144      ( 23 Aug 2017 07:11 )             31.9                     08-23    143  |  109<H>  |  15  ----------------------------<  147<H>  3.5   |  25  |  0.84    Ca    8.6      23 Aug 2017 07:11        Physical Exam :    -   Dressing changed sterile.   -   Wound C/D/I.   -   Distal Neurvascular status intact grossly.   -   Warm well perfused; capillary refill <3 seconds   -   (+)EHL/FHL 5/5  -   (+) Sensation to light touch  -   (-) Calf tenderness Bilaterally    A/P: 72y Male s/p Primary osteoarthritis of right knee    -   Ortho Stable  -   Pain control   -   Medicine to follow  -   DVT ppx:     [ ]SCDs     [x ] ASA     [ ] Eliquis     [ ] Lovenox  -   Weight bearing status:  WBAT [x ]        PWB    [ ]     TTWB  [ ]      NWB  [ ]   -  Dispo:     Home [x ]     Acute Rehab [ ]     SANDI [ ]     TBD [ ]

## 2017-08-23 NOTE — DISCHARGE NOTE ADULT - HOSPITAL COURSE
This patient was admitted to [facility] with a history of severe degenerative joint disease of the right knee.  Patient went to Pre-Surgical Testing at [facility] and was medically cleared to undergo elective procedure. Patient underwent right TKR by Dr. Lalito Rojas on 8/22/17. Procedure was well tolerated.  No operative or richard-operative complications arose during patients hospital course.  Patient received antibiotic according to SCIP guidelines for infection prevention. Aspirin was given for DVT prophylaxis.  Anesthesia, Medical Hospitalist, Physical Therapy and Occupational Therapy were consulted. Patient is stable for discharge with a good prognosis.  Appropriate discharge instructions and medications are provided in this document. This 71yo male patient was admitted to [facility] with a history of severe degenerative joint disease of the right knee.  Patient went to Pre-Surgical Testing at [facility] and was medically cleared to undergo elective procedure. Patient underwent an uncomplciated right total knee replacement by Dr. Lalito Rojas on 8/22/17.  Patient received antibiotic according to SCIP guidelines for infection prevention. Aspirin was given for DVT prophylaxis.  Anesthesia, Medical Hospitalist, Physical Therapy and Occupational Therapy were consulted. Patient is stable for discharge home on 8-225-17 with a good prognosis.  Appropriate discharge instructions and medications are provided in this document.

## 2017-08-23 NOTE — DISCHARGE NOTE ADULT - PATIENT PORTAL LINK FT
“You can access the [employer] Patient Portal, offered by [facility], by registering with the following website: [email or web address]”

## 2017-08-23 NOTE — DISCHARGE NOTE ADULT - INSTRUCTIONS
none  For Constipation :   • Increase your water intake. Drink at least 8 glasses of water daily.  • Try adding fiber to your diet by eating fruits, vegetables and foods that are rich in grains.  • If you do experience constipation, you may take an over-the-counter stool softener/laxative such as Carol Colace, [place] or  Milk of Magnesia.

## 2017-08-23 NOTE — DISCHARGE NOTE ADULT - MEDICATION SUMMARY - MEDICATIONS TO TAKE
I will START or STAY ON the medications listed below when I get home from the hospital:    rolling walker  -- to asssist w adls  s/p TKR  -- Indication: For Ambulation    CPM machine  -- to assist w adls s/p TKR   Start at 0 degrees flexion and increase 5 degrees bid as tolerated for 6 hrs per day  -- Indication: For range of motion of right knee    celecoxib 200 mg oral capsule  -- 1 cap(s) by mouth 2 times a day (with meals)  -- Indication: For Pain management    acetaminophen 500 mg oral tablet  -- 2 tab(s) by mouth every 12 hours  -- Indication: For Pain management    oxyCODONE 5 mg oral tablet  -- 1 tab(s) by mouth every 4 to 6 hours, As Needed, Mild Pain MDD:6  -- Indication: For moderate to severe knee pain    aspirin 81 mg oral delayed release tablet  -- 2 tab(s) by mouth every 12 hours for 6 weeks total.  -- Indication: For blood clot prevention    oxyCODONE 5 mg oral tablet  -- 1 tab(s) by mouth every 4 to 6 hours, As Needed, -for moderate to severe pain MDD:6  -- Indication: For Duplicate order    Diovan 320 mg oral tablet  -- 1 tab(s) by mouth once a day  -- Indication: For high blood pressure    doxazosin 4 mg oral tablet  -- 1 tab(s) by mouth once a day  -- Indication: For Enlarged  prostate    Lipitor 40 mg oral tablet  -- 1 tab(s) by mouth once a day  -- Indication: For high cholesterol    Dyazide 37.5 mg-25 mg oral capsule  -- 1 cap(s) by mouth once a day  -- Indication: For high blood pressure    Coreg CR 40 mg oral capsule, extended release  -- 1 cap(s) by mouth once a day (in the morning)  -- Indication: For high blood pressure    Nifedical XL 60 mg oral tablet, extended release  -- 1 tab(s) by mouth once a day  -- Indication: For high blood pressure    Citrucel  --  by mouth   -- Indication: For bowels    Coenzyme Q10 60 mg oral capsule  --  by mouth once a day  -- Indication: For supplement    omeprazole 40 mg oral delayed release capsule  -- 1 cap(s) by mouth once a day  -- Indication: For reflux and ulcer prevention    hydrALAZINE 50 mg oral tablet  --  by mouth 2 times a day  -- Indication: For high blood pressure    Vitamin C 500 mg oral tablet  -- 1 tab(s) by mouth once a day  -- Indication: For supplement

## 2017-08-23 NOTE — DISCHARGE NOTE ADULT - CARE PROVIDER_API CALL
Lalito Rojas), Orthopaedic Surgery  [address] [place] [address]  Phone: [phone]  Fax: [phone]

## 2017-08-24 LAB
ANION GAP SERPL CALC-SCNC: 8 MMOL/L — SIGNIFICANT CHANGE UP (ref 5–17)
BUN SERPL-MCNC: 20 MG/DL — SIGNIFICANT CHANGE UP (ref 7–23)
CALCIUM SERPL-MCNC: 8.8 MG/DL — SIGNIFICANT CHANGE UP (ref 8.4–10.5)
CHLORIDE SERPL-SCNC: 110 MMOL/L — HIGH (ref 96–108)
CO2 SERPL-SCNC: 27 MMOL/L — SIGNIFICANT CHANGE UP (ref 22–31)
CREAT SERPL-MCNC: 0.96 MG/DL — SIGNIFICANT CHANGE UP (ref 0.5–1.3)
GLUCOSE SERPL-MCNC: 103 MG/DL — HIGH (ref 70–99)
HCT VFR BLD CALC: 32.8 % — LOW (ref 39–50)
HGB BLD-MCNC: 11 G/DL — LOW (ref 13–17)
MCHC RBC-ENTMCNC: 31.9 PG — SIGNIFICANT CHANGE UP (ref 27–34)
MCHC RBC-ENTMCNC: 33.6 GM/DL — SIGNIFICANT CHANGE UP (ref 32–36)
MCV RBC AUTO: 94.8 FL — SIGNIFICANT CHANGE UP (ref 80–100)
PLATELET # BLD AUTO: 144 K/UL — LOW (ref 150–400)
POTASSIUM SERPL-MCNC: 3.7 MMOL/L — SIGNIFICANT CHANGE UP (ref 3.5–5.3)
POTASSIUM SERPL-SCNC: 3.7 MMOL/L — SIGNIFICANT CHANGE UP (ref 3.5–5.3)
RBC # BLD: 3.46 M/UL — LOW (ref 4.2–5.8)
RBC # FLD: 12.4 % — SIGNIFICANT CHANGE UP (ref 10.3–14.5)
SODIUM SERPL-SCNC: 145 MMOL/L — SIGNIFICANT CHANGE UP (ref 135–145)
WBC # BLD: 9.5 K/UL — SIGNIFICANT CHANGE UP (ref 3.8–10.5)
WBC # FLD AUTO: 9.5 K/UL — SIGNIFICANT CHANGE UP (ref 3.8–10.5)

## 2017-08-24 PROCEDURE — 99233 SBSQ HOSP IP/OBS HIGH 50: CPT

## 2017-08-24 RX ADMIN — OXYCODONE HYDROCHLORIDE 5 MILLIGRAM(S): 5 TABLET ORAL at 06:15

## 2017-08-24 RX ADMIN — Medication 60 MILLIGRAM(S): at 17:37

## 2017-08-24 RX ADMIN — Medication 4 MILLIGRAM(S): at 21:28

## 2017-08-24 RX ADMIN — Medication 100 MILLIGRAM(S): at 21:28

## 2017-08-24 RX ADMIN — OXYCODONE HYDROCHLORIDE 5 MILLIGRAM(S): 5 TABLET ORAL at 05:45

## 2017-08-24 RX ADMIN — Medication 162 MILLIGRAM(S): at 21:28

## 2017-08-24 RX ADMIN — ATORVASTATIN CALCIUM 40 MILLIGRAM(S): 80 TABLET, FILM COATED ORAL at 21:28

## 2017-08-24 RX ADMIN — CELECOXIB 200 MILLIGRAM(S): 200 CAPSULE ORAL at 17:37

## 2017-08-24 RX ADMIN — PANTOPRAZOLE SODIUM 40 MILLIGRAM(S): 20 TABLET, DELAYED RELEASE ORAL at 05:45

## 2017-08-24 RX ADMIN — Medication 60 MILLIGRAM(S): at 05:43

## 2017-08-24 RX ADMIN — CELECOXIB 200 MILLIGRAM(S): 200 CAPSULE ORAL at 08:16

## 2017-08-24 RX ADMIN — Medication 1000 MILLIGRAM(S): at 17:37

## 2017-08-24 RX ADMIN — Medication 1000 MILLIGRAM(S): at 18:05

## 2017-08-24 RX ADMIN — Medication 1000 MILLIGRAM(S): at 10:55

## 2017-08-24 RX ADMIN — OXYCODONE HYDROCHLORIDE 5 MILLIGRAM(S): 5 TABLET ORAL at 17:36

## 2017-08-24 RX ADMIN — CELECOXIB 200 MILLIGRAM(S): 200 CAPSULE ORAL at 18:10

## 2017-08-24 RX ADMIN — Medication 50 MILLIGRAM(S): at 17:37

## 2017-08-24 RX ADMIN — Medication 100 MILLIGRAM(S): at 05:45

## 2017-08-24 RX ADMIN — CARVEDILOL PHOSPHATE 40 MILLIGRAM(S): 80 CAPSULE, EXTENDED RELEASE ORAL at 11:53

## 2017-08-24 RX ADMIN — OXYCODONE HYDROCHLORIDE 5 MILLIGRAM(S): 5 TABLET ORAL at 18:30

## 2017-08-24 RX ADMIN — CELECOXIB 200 MILLIGRAM(S): 200 CAPSULE ORAL at 08:55

## 2017-08-24 RX ADMIN — Medication 162 MILLIGRAM(S): at 08:16

## 2017-08-24 RX ADMIN — Medication 50 MILLIGRAM(S): at 05:44

## 2017-08-24 RX ADMIN — Medication 1 CAPSULE(S): at 05:44

## 2017-08-24 RX ADMIN — Medication 1000 MILLIGRAM(S): at 10:22

## 2017-08-24 RX ADMIN — VALSARTAN 320 MILLIGRAM(S): 80 TABLET ORAL at 05:44

## 2017-08-24 RX ADMIN — SENNA PLUS 2 TABLET(S): 8.6 TABLET ORAL at 21:29

## 2017-08-24 RX ADMIN — Medication 100 MILLIGRAM(S): at 13:50

## 2017-08-24 NOTE — PROGRESS NOTE ADULT - SUBJECTIVE AND OBJECTIVE BOX
ORTHOPEDIC PA PROGRESS NOTE  VAL GOODMAN      72y Male                                                                                                                               POD #    2    STATUS POST:               Pre-Op Dx: Primary osteoarthritis of right knee    Post-Op Dx:  Primary osteoarthritis of right knee    Procedure: Arthroplasty of right knee                                                  T(F): 97.7  HR: 76  BP: 133/68  RR: 16  SpO2: 97%                        11.0   9.5   )-----------( 144      ( 24 Aug 2017 07:11 )             32.8                     08-23    143  |  109<H>  |  15  ----------------------------<  147<H>  3.5   |  25  |  0.84    Ca    8.6      23 Aug 2017 07:11        Physical Exam :    -   Dressing changed sterile.   -   Wound C/D/I.   -   Distal Neurvascular status intact grossly.   -   Warm well perfused; capillary refill <3 seconds   -   (+)EHL/FHL 5/5  -   (+) Sensation to light touch  -   (-) Calf tenderness Bilaterally    A/P: 72y Male s/p Primary osteoarthritis of right knee    -   Ortho Stable  -   Pain control   -   Medicine to follow  -   DVT ppx:     [ ]SCDs     [x ] ASA     [ ] Eliquis     [ ] Lovenox  -   Weight bearing status:  WBAT [x ]        PWB    [ ]     TTWB  [ ]      NWB  [ ]   -  Dispo:     Home [x ]     Acute Rehab [ ]     SANDI [ ]     TBD [ ]

## 2017-08-24 NOTE — PROGRESS NOTE ADULT - PROBLEM SELECTOR PLAN 1
oxycodone.  PT/OT.  DVT prophylaxis.  DVT ppx: [ ]ASA81 [ x] [id number] [ ] Lovenox [ ] Coumadin   [ ] Eliquis [  ] Heparin  Dispo:     Home [ ]     Acute Rehab [ ]     SANDI [ ]     TBD [x ]. oxycodone.  PT/OT.  DVT prophylaxis.  DVT ppx: [ ]ASA81 [ x] [id number] [ ] Lovenox [ ] Coumadin   [ ] Eliquis [  ] Heparin  Dispo:     Home [x ]     Acute Rehab [ ]     SANDI [ ]     TBD [ ].

## 2017-08-24 NOTE — PROGRESS NOTE ADULT - SUBJECTIVE AND OBJECTIVE BOX
Patient is a 72y old  Male who presents with a chief complaint of "Dr Rojas is going to replace my right knee" (23 Aug 2017 14:12)      INTERVAL HPI/OVERNIGHT EVENTS:  pt is seen and examined.  feels better.pain is controlled.  Pain Location & Control:     MEDICATIONS  (STANDING):  lactated ringers. 1000 milliLiter(s) (75 mL/Hr) IV Continuous <Continuous>  pantoprazole    Tablet 40 milliGRAM(s) Oral before breakfast  senna 2 Tablet(s) Oral at bedtime  docusate sodium 100 milliGRAM(s) Oral three times a day  celecoxib 200 milliGRAM(s) Oral two times a day with meals  aspirin enteric coated 162 milliGRAM(s) Oral every 12 hours  valsartan 320 milliGRAM(s) Oral daily  doxazosin 4 milliGRAM(s) Oral at bedtime  atorvastatin 40 milliGRAM(s) Oral at bedtime  hydrALAZINE 50 milliGRAM(s) Oral two times a day  triamterene 37.5 mG/hydrochlorothiazide 25 mG Capsule 1 Capsule(s) Oral daily  acetaminophen   Tablet. 1000 milliGRAM(s) Oral every 8 hours  carvedilol CR 40 milliGRAM(s) Oral daily  NIFEdipine XL 60 milliGRAM(s) Oral every 12 hours    MEDICATIONS  (PRN):  aluminum hydroxide/magnesium hydroxide/simethicone Suspension 30 milliLiter(s) Oral four times a day PRN Indigestion  ondansetron Injectable 4 milliGRAM(s) IV Push every 6 hours PRN Nausea and/or Vomiting  magnesium hydroxide Suspension 30 milliLiter(s) Oral daily PRN Constipation  polyethylene glycol 3350 17 Gram(s) Oral daily PRN Constipation  bisacodyl Suppository 10 milliGRAM(s) Rectal daily PRN If no bowel movement by postoperative day #2  oxyCODONE    IR 5 milliGRAM(s) Oral every 3 hours PRN Mild Pain  oxyCODONE    IR 10 milliGRAM(s) Oral every 3 hours PRN Moderate Pain  HYDROmorphone  Injectable 0.5 milliGRAM(s) IV Push every 3 hours PRN Severe Pain      Allergies    No Known Allergies    Intolerances            Vital Signs Last 24 Hrs  T(C): 36.5 (24 Aug 2017 07:40), Max: 36.9 (24 Aug 2017 03:18)  T(F): 97.7 (24 Aug 2017 07:40), Max: 98.5 (24 Aug 2017 03:18)  HR: 76 (24 Aug 2017 07:40) (59 - 76)  BP: 133/68 (24 Aug 2017 07:40) (112/65 - 158/65)  BP(mean): --  RR: 16 (24 Aug 2017 07:40) (16 - 16)  SpO2: 97% (24 Aug 2017 07:40) (97% - 99%)        LABS:                        11.0   9.5   )-----------( 144      ( 24 Aug 2017 07:11 )             32.8     24 Aug 2017 07:11    145    |  110    |  20     ----------------------------<  103    3.7     |  27     |  0.96     Ca    8.8        24 Aug 2017 07:11          RADIOLOGY & ADDITIONAL TESTS:    Imaging Personally Reviewed:  [ ] YES  [ ] NO    Consultant(s) Notes Reviewed:  [x ] YES  [ ] NO    Care Discussed with Consultants/Other Providers [ x] YES  [ ] NO

## 2017-08-25 VITALS
RESPIRATION RATE: 16 BRPM | HEART RATE: 87 BPM | TEMPERATURE: 98 F | DIASTOLIC BLOOD PRESSURE: 63 MMHG | SYSTOLIC BLOOD PRESSURE: 125 MMHG | OXYGEN SATURATION: 98 %

## 2017-08-25 LAB
ANION GAP SERPL CALC-SCNC: 9 MMOL/L — SIGNIFICANT CHANGE UP (ref 5–17)
BUN SERPL-MCNC: 20 MG/DL — SIGNIFICANT CHANGE UP (ref 7–23)
CALCIUM SERPL-MCNC: 8.8 MG/DL — SIGNIFICANT CHANGE UP (ref 8.4–10.5)
CHLORIDE SERPL-SCNC: 107 MMOL/L — SIGNIFICANT CHANGE UP (ref 96–108)
CO2 SERPL-SCNC: 26 MMOL/L — SIGNIFICANT CHANGE UP (ref 22–31)
CREAT SERPL-MCNC: 0.99 MG/DL — SIGNIFICANT CHANGE UP (ref 0.5–1.3)
GLUCOSE SERPL-MCNC: 99 MG/DL — SIGNIFICANT CHANGE UP (ref 70–99)
HCT VFR BLD CALC: 32.5 % — LOW (ref 39–50)
HGB BLD-MCNC: 11.2 G/DL — LOW (ref 13–17)
MCHC RBC-ENTMCNC: 32.5 PG — SIGNIFICANT CHANGE UP (ref 27–34)
MCHC RBC-ENTMCNC: 34.4 GM/DL — SIGNIFICANT CHANGE UP (ref 32–36)
MCV RBC AUTO: 94.4 FL — SIGNIFICANT CHANGE UP (ref 80–100)
PLATELET # BLD AUTO: 162 K/UL — SIGNIFICANT CHANGE UP (ref 150–400)
POTASSIUM SERPL-MCNC: 4 MMOL/L — SIGNIFICANT CHANGE UP (ref 3.5–5.3)
POTASSIUM SERPL-SCNC: 4 MMOL/L — SIGNIFICANT CHANGE UP (ref 3.5–5.3)
RBC # BLD: 3.45 M/UL — LOW (ref 4.2–5.8)
RBC # FLD: 11.9 % — SIGNIFICANT CHANGE UP (ref 10.3–14.5)
SODIUM SERPL-SCNC: 142 MMOL/L — SIGNIFICANT CHANGE UP (ref 135–145)
WBC # BLD: 9.9 K/UL — SIGNIFICANT CHANGE UP (ref 3.8–10.5)
WBC # FLD AUTO: 9.9 K/UL — SIGNIFICANT CHANGE UP (ref 3.8–10.5)

## 2017-08-25 PROCEDURE — 99233 SBSQ HOSP IP/OBS HIGH 50: CPT

## 2017-08-25 RX ORDER — ASPIRIN/CALCIUM CARB/MAGNESIUM 324 MG
2 TABLET ORAL
Qty: 156 | Refills: 0 | OUTPATIENT
Start: 2017-08-25 | End: 2017-10-03

## 2017-08-25 RX ORDER — OXYCODONE HYDROCHLORIDE 5 MG/1
1 TABLET ORAL
Qty: 60 | Refills: 0 | OUTPATIENT
Start: 2017-08-25

## 2017-08-25 RX ORDER — ACETAMINOPHEN 500 MG
2 TABLET ORAL
Qty: 80 | Refills: 0 | OUTPATIENT
Start: 2017-08-25 | End: 2017-09-14

## 2017-08-25 RX ORDER — ASPIRIN/CALCIUM CARB/MAGNESIUM 324 MG
1 TABLET ORAL
Qty: 0 | Refills: 0 | COMMUNITY
Start: 2017-08-25 | End: 2017-10-03

## 2017-08-25 RX ADMIN — Medication 162 MILLIGRAM(S): at 09:08

## 2017-08-25 RX ADMIN — PANTOPRAZOLE SODIUM 40 MILLIGRAM(S): 20 TABLET, DELAYED RELEASE ORAL at 05:56

## 2017-08-25 RX ADMIN — Medication 1000 MILLIGRAM(S): at 09:08

## 2017-08-25 RX ADMIN — CARVEDILOL PHOSPHATE 40 MILLIGRAM(S): 80 CAPSULE, EXTENDED RELEASE ORAL at 06:45

## 2017-08-25 RX ADMIN — Medication 1 CAPSULE(S): at 05:56

## 2017-08-25 RX ADMIN — OXYCODONE HYDROCHLORIDE 5 MILLIGRAM(S): 5 TABLET ORAL at 06:26

## 2017-08-25 RX ADMIN — Medication 50 MILLIGRAM(S): at 05:56

## 2017-08-25 RX ADMIN — Medication 100 MILLIGRAM(S): at 05:55

## 2017-08-25 RX ADMIN — Medication 60 MILLIGRAM(S): at 05:56

## 2017-08-25 RX ADMIN — CELECOXIB 200 MILLIGRAM(S): 200 CAPSULE ORAL at 09:08

## 2017-08-25 RX ADMIN — CELECOXIB 200 MILLIGRAM(S): 200 CAPSULE ORAL at 09:07

## 2017-08-25 RX ADMIN — OXYCODONE HYDROCHLORIDE 5 MILLIGRAM(S): 5 TABLET ORAL at 05:56

## 2017-08-25 RX ADMIN — VALSARTAN 320 MILLIGRAM(S): 80 TABLET ORAL at 05:56

## 2017-08-25 NOTE — PROGRESS NOTE ADULT - PROBLEM SELECTOR PROBLEM 1
Aftercare following right knee joint replacement surgery

## 2017-08-25 NOTE — PROGRESS NOTE ADULT - SUBJECTIVE AND OBJECTIVE BOX
Patient is a 72y old  Male who presents with a chief complaint of "Dr Rojas is going to replace my right knee" (23 Aug 2017 14:12)      INTERVAL HPI/OVERNIGHT EVENTS:  Pt is seen and examined.  feels better, pain is controlled.  Pain Location & Control:     MEDICATIONS  (STANDING):  lactated ringers. 1000 milliLiter(s) (75 mL/Hr) IV Continuous <Continuous>  pantoprazole    Tablet 40 milliGRAM(s) Oral before breakfast  senna 2 Tablet(s) Oral at bedtime  docusate sodium 100 milliGRAM(s) Oral three times a day  celecoxib 200 milliGRAM(s) Oral two times a day with meals  aspirin enteric coated 162 milliGRAM(s) Oral every 12 hours  valsartan 320 milliGRAM(s) Oral daily  doxazosin 4 milliGRAM(s) Oral at bedtime  atorvastatin 40 milliGRAM(s) Oral at bedtime  hydrALAZINE 50 milliGRAM(s) Oral two times a day  triamterene 37.5 mG/hydrochlorothiazide 25 mG Capsule 1 Capsule(s) Oral daily  acetaminophen   Tablet. 1000 milliGRAM(s) Oral every 8 hours  carvedilol CR 40 milliGRAM(s) Oral daily  NIFEdipine XL 60 milliGRAM(s) Oral every 12 hours    MEDICATIONS  (PRN):  aluminum hydroxide/magnesium hydroxide/simethicone Suspension 30 milliLiter(s) Oral four times a day PRN Indigestion  ondansetron Injectable 4 milliGRAM(s) IV Push every 6 hours PRN Nausea and/or Vomiting  magnesium hydroxide Suspension 30 milliLiter(s) Oral daily PRN Constipation  polyethylene glycol 3350 17 Gram(s) Oral daily PRN Constipation  bisacodyl Suppository 10 milliGRAM(s) Rectal daily PRN If no bowel movement by postoperative day #2  oxyCODONE    IR 5 milliGRAM(s) Oral every 3 hours PRN Mild Pain  oxyCODONE    IR 10 milliGRAM(s) Oral every 3 hours PRN Moderate Pain  HYDROmorphone  Injectable 0.5 milliGRAM(s) IV Push every 3 hours PRN Severe Pain      Allergies    No Known Allergies    Intolerances            Vital Signs Last 24 Hrs  T(C): 36.8 (25 Aug 2017 07:27), Max: 37 (24 Aug 2017 23:30)  T(F): 98.2 (25 Aug 2017 07:27), Max: 98.6 (24 Aug 2017 23:30)  HR: 70 (25 Aug 2017 07:27) (60 - 81)  BP: 144/75 (25 Aug 2017 07:27) (108/64 - 150/69)  BP(mean): --  RR: 17 (25 Aug 2017 07:27) (15 - 18)  SpO2: 98% (25 Aug 2017 07:27) (97% - 99%)        LABS:                        11.2   9.9   )-----------( 162      ( 25 Aug 2017 07:39 )             32.5     25 Aug 2017 07:39    142    |  107    |  20     ----------------------------<  99     4.0     |  26     |  0.99     Ca    8.8        25 Aug 2017 07:39        Imaging Personally Reviewed:  [ ] YES  [ ] NO    Consultant(s) Notes Reviewed:  [x ] YES  [ ] NO    Care Discussed with Consultants/Other Providers [ x] YES  [ ] NO

## 2017-08-25 NOTE — PROGRESS NOTE ADULT - PROBLEM SELECTOR PLAN 3
dyazide, valsartan,coreg with parameter..

## 2017-08-25 NOTE — PROGRESS NOTE ADULT - PROBLEM SELECTOR PLAN 1
oxycodone.  PT/OT.  DVT prophylaxis.  DVT ppx: [ ]ASA81 [ x] [id number] [ ] Lovenox [ ] Coumadin   [ ] Eliquis [  ] Heparin  Dispo:     Home [x ]     Acute Rehab [ ]     SANDI [ ]     TBD [ ].

## 2017-08-25 NOTE — PROGRESS NOTE ADULT - SUBJECTIVE AND OBJECTIVE BOX
Orthopedic P.A.- POD# 3 - s/p right TKR    Patient alert and comfortable in CPM machine (0'-80').  Denies knee pain or nausea.    Vital Signs Last 24 Hrs  T(C): 36.8 (25 Aug 2017 07:27), Max: 37 (24 Aug 2017 23:30)  T(F): 98.2 (25 Aug 2017 07:27), Max: 98.6 (24 Aug 2017 23:30)  HR: 70 (25 Aug 2017 07:27) (60 - 81)  BP: 144/75 (25 Aug 2017 07:27) (108/64 - 150/69)  BP(mean): --  RR: 17 (25 Aug 2017 07:27) (15 - 18)  SpO2: 98% (25 Aug 2017 07:27) (97% - 99%)         I&O's Detail    24 Aug 2017 07:01  -  25 Aug 2017 07:00  --------------------------------------------------------  IN:  Total IN: 0 mL    OUT:    Voided: 400 mL  Total OUT: 400 mL    Total NET: -400 mL                 CPM machine 0-80 degrees flexion.    Labs:                                                                11.2<L>  9.9   )-----------( 162      ( 25 Aug 2017 07:39 )             32.5<L>  25 Aug 2017 07:39                                25 Aug 2017 07:39    142    |  107    |  20     ----------------------------<  99     4.0     |  26     |  0.99     Ca    8.8        25 Aug 2017 07:39        MEDICATIONS:  aluminum hydroxide/magnesium hydroxide/simethicone Suspension 30 milliLiter(s) Oral four times a day PRN  ondansetron Injectable 4 milliGRAM(s) IV Push every 6 hours PRN  pantoprazole    Tablet 40 milliGRAM(s) Oral before breakfast  magnesium hydroxide Suspension 30 milliLiter(s) Oral daily PRN  polyethylene glycol 3350 17 Gram(s) Oral daily PRN  bisacodyl Suppository 10 milliGRAM(s) Rectal daily PRN  senna 2 Tablet(s) Oral at bedtime  docusate sodium 100 milliGRAM(s) Oral three times a day  oxyCODONE    IR 5 milliGRAM(s) Oral every 3 hours PRN  oxyCODONE    IR 10 milliGRAM(s) Oral every 3 hours PRN  HYDROmorphone  Injectable 0.5 milliGRAM(s) IV Push every 3 hours PRN  celecoxib 200 milliGRAM(s) Oral two times a day with meals  aspirin enteric coated 162 milliGRAM(s) Oral every 12 hours  valsartan 320 milliGRAM(s) Oral daily  doxazosin 4 milliGRAM(s) Oral at bedtime  atorvastatin 40 milliGRAM(s) Oral at bedtime  hydrALAZINE 50 milliGRAM(s) Oral two times a day  triamterene 37.5 mG/hydrochlorothiazide 25 mG Capsule 1 Capsule(s) Oral daily  acetaminophen   Tablet. 1000 milliGRAM(s) Oral every 8 hours  carvedilol CR 40 milliGRAM(s) Oral daily  NIFEdipine XL 60 milliGRAM(s) Oral every 12 hours    Anticoagulation:  aspirin enteric coated 162 milliGRAM(s) Oral every 12 hours        Pain medications:   ondansetron Injectable 4 milliGRAM(s) IV Push every 6 hours PRN  oxyCODONE    IR 5 milliGRAM(s) Oral every 3 hours PRN  oxyCODONE    IR 10 milliGRAM(s) Oral every 3 hours PRN  HYDROmorphone  Injectable 0.5 milliGRAM(s) IV Push every 3 hours PRN  celecoxib 200 milliGRAM(s) Oral two times a day with meals  acetaminophen   Tablet. 1000 milliGRAM(s) Oral every 8 hours                                      Physical Exam:  Right knee- Prineo tape dry and intact over surgical wound.  Neurovascular grossly intact LE's.  PAS on LE's.  Calves soft and non-tender.                                                                                                                                                          A/P:  Orthopedically stable.  -Continue pain management with prn narcotics and scheduled acetaminophen and Celebrex.  -Continue DVT prophylaxis with 6 weeks Ecotrin  -Increase ambulation and ROM right knee with PT/OT and CPM machine  -Dr. Smith for medical clearance for discharge home today

## 2017-08-25 NOTE — PROGRESS NOTE ADULT - PROBLEM SELECTOR PROBLEM 4
Gastroesophageal reflux disease without esophagitis

## 2017-08-25 NOTE — PROGRESS NOTE ADULT - PROBLEM SELECTOR PROBLEM 6
Other elevated white blood cell (WBC) count

## 2017-10-19 PROCEDURE — C1889: CPT

## 2017-10-19 PROCEDURE — 94664 DEMO&/EVAL PT USE INHALER: CPT

## 2017-10-19 PROCEDURE — 88311 DECALCIFY TISSUE: CPT

## 2017-10-19 PROCEDURE — 97110 THERAPEUTIC EXERCISES: CPT

## 2017-10-19 PROCEDURE — C1776: CPT

## 2017-10-19 PROCEDURE — 80048 BASIC METABOLIC PNL TOTAL CA: CPT

## 2017-10-19 PROCEDURE — 97165 OT EVAL LOW COMPLEX 30 MIN: CPT

## 2017-10-19 PROCEDURE — 36415 COLL VENOUS BLD VENIPUNCTURE: CPT

## 2017-10-19 PROCEDURE — 73562 X-RAY EXAM OF KNEE 3: CPT

## 2017-10-19 PROCEDURE — 97116 GAIT TRAINING THERAPY: CPT

## 2017-10-19 PROCEDURE — C1713: CPT

## 2017-10-19 PROCEDURE — 97161 PT EVAL LOW COMPLEX 20 MIN: CPT

## 2017-10-19 PROCEDURE — 85027 COMPLETE CBC AUTOMATED: CPT

## 2017-10-19 PROCEDURE — 97535 SELF CARE MNGMENT TRAINING: CPT

## 2017-10-19 PROCEDURE — 85018 HEMOGLOBIN: CPT

## 2017-10-19 PROCEDURE — 88305 TISSUE EXAM BY PATHOLOGIST: CPT

## 2018-06-05 ENCOUNTER — RECORD ABSTRACTING (OUTPATIENT)
Age: 73
End: 2018-06-05

## 2018-06-05 DIAGNOSIS — S80.00XA CONTUSION OF UNSPECIFIED KNEE, INITIAL ENCOUNTER: ICD-10-CM

## 2018-06-05 DIAGNOSIS — Z82.49 FAMILY HISTORY OF ISCHEMIC HEART DISEASE AND OTHER DISEASES OF THE CIRCULATORY SYSTEM: ICD-10-CM

## 2018-06-05 DIAGNOSIS — M72.2 PLANTAR FASCIAL FIBROMATOSIS: ICD-10-CM

## 2018-06-05 RX ORDER — CEPHALEXIN 500 MG/1
500 CAPSULE ORAL
Refills: 0 | Status: ACTIVE | COMMUNITY

## 2018-06-18 ENCOUNTER — APPOINTMENT (OUTPATIENT)
Dept: ORTHOPEDIC SURGERY | Facility: CLINIC | Age: 73
End: 2018-06-18

## 2018-07-02 ENCOUNTER — APPOINTMENT (OUTPATIENT)
Dept: ORTHOPEDIC SURGERY | Facility: CLINIC | Age: 73
End: 2018-07-02
Payer: MEDICARE

## 2018-07-02 VITALS — HEIGHT: 70 IN | WEIGHT: 197 LBS | BODY MASS INDEX: 28.2 KG/M2

## 2018-07-02 PROCEDURE — 99214 OFFICE O/P EST MOD 30 MIN: CPT

## 2018-08-02 PROBLEM — E78.5 HYPERLIPIDEMIA, UNSPECIFIED: Chronic | Status: ACTIVE | Noted: 2017-08-03

## 2018-08-02 PROBLEM — M17.11 UNILATERAL PRIMARY OSTEOARTHRITIS, RIGHT KNEE: Chronic | Status: ACTIVE | Noted: 2017-08-03

## 2018-08-02 PROBLEM — N40.0 BENIGN PROSTATIC HYPERPLASIA WITHOUT LOWER URINARY TRACT SYMPTOMS: Chronic | Status: ACTIVE | Noted: 2017-08-03

## 2018-08-02 PROBLEM — K21.9 GASTRO-ESOPHAGEAL REFLUX DISEASE WITHOUT ESOPHAGITIS: Chronic | Status: ACTIVE | Noted: 2017-08-03

## 2018-08-02 PROBLEM — I10 ESSENTIAL (PRIMARY) HYPERTENSION: Chronic | Status: ACTIVE | Noted: 2017-08-03

## 2018-08-03 NOTE — PATIENT PROFILE ADULT. - BILL OF RIGHTS/ADMISSION INFORMATION PROVIDED TO:
Problem: Knowledge Deficit  Goal: Knowledge of disease process/condition, treatment plan, diagnostic tests, and medications will improve  Outcome: PROGRESSING AS EXPECTED  Family updated on plan of care for the evening.     Problem: Respiratory:  Goal: Respiratory status will improve  Outcome: PROGRESSING AS EXPECTED  Pt extubated yesterday and tolerating low flow oxygen via nasal cannula.        Patient

## 2018-08-09 ENCOUNTER — OUTPATIENT (OUTPATIENT)
Dept: OUTPATIENT SERVICES | Facility: HOSPITAL | Age: 73
LOS: 1 days | End: 2018-08-09
Payer: MEDICARE

## 2018-08-09 VITALS
RESPIRATION RATE: 16 BRPM | SYSTOLIC BLOOD PRESSURE: 144 MMHG | HEIGHT: 71 IN | OXYGEN SATURATION: 98 % | DIASTOLIC BLOOD PRESSURE: 74 MMHG | TEMPERATURE: 98 F | WEIGHT: 200.18 LBS | HEART RATE: 82 BPM

## 2018-08-09 DIAGNOSIS — M17.12 UNILATERAL PRIMARY OSTEOARTHRITIS, LEFT KNEE: ICD-10-CM

## 2018-08-09 DIAGNOSIS — Z01.818 ENCOUNTER FOR OTHER PREPROCEDURAL EXAMINATION: ICD-10-CM

## 2018-08-09 DIAGNOSIS — Z96.651 PRESENCE OF RIGHT ARTIFICIAL KNEE JOINT: Chronic | ICD-10-CM

## 2018-08-09 DIAGNOSIS — Z90.89 ACQUIRED ABSENCE OF OTHER ORGANS: Chronic | ICD-10-CM

## 2018-08-09 DIAGNOSIS — Z98.890 OTHER SPECIFIED POSTPROCEDURAL STATES: Chronic | ICD-10-CM

## 2018-08-09 LAB
ALBUMIN SERPL ELPH-MCNC: 4 G/DL — SIGNIFICANT CHANGE UP (ref 3.3–5)
ALP SERPL-CCNC: 51 U/L — SIGNIFICANT CHANGE UP (ref 30–120)
ALT FLD-CCNC: 29 U/L DA — SIGNIFICANT CHANGE UP (ref 10–60)
ANION GAP SERPL CALC-SCNC: 3 MMOL/L — LOW (ref 5–17)
APTT BLD: 31 SEC — SIGNIFICANT CHANGE UP (ref 27.5–37.4)
AST SERPL-CCNC: 19 U/L — SIGNIFICANT CHANGE UP (ref 10–40)
BILIRUB SERPL-MCNC: 0.6 MG/DL — SIGNIFICANT CHANGE UP (ref 0.2–1.2)
BLD GP AB SCN SERPL QL: SIGNIFICANT CHANGE UP
BUN SERPL-MCNC: 28 MG/DL — HIGH (ref 7–23)
CALCIUM SERPL-MCNC: 9.4 MG/DL — SIGNIFICANT CHANGE UP (ref 8.4–10.5)
CHLORIDE SERPL-SCNC: 107 MMOL/L — SIGNIFICANT CHANGE UP (ref 96–108)
CO2 SERPL-SCNC: 31 MMOL/L — SIGNIFICANT CHANGE UP (ref 22–31)
CREAT SERPL-MCNC: 1.08 MG/DL — SIGNIFICANT CHANGE UP (ref 0.5–1.3)
GLUCOSE SERPL-MCNC: 121 MG/DL — HIGH (ref 70–99)
HCT VFR BLD CALC: 37.8 % — LOW (ref 39–50)
HGB BLD-MCNC: 12.8 G/DL — LOW (ref 13–17)
INR BLD: 1.04 RATIO — SIGNIFICANT CHANGE UP (ref 0.88–1.16)
MCHC RBC-ENTMCNC: 30.5 PG — SIGNIFICANT CHANGE UP (ref 27–34)
MCHC RBC-ENTMCNC: 33.9 GM/DL — SIGNIFICANT CHANGE UP (ref 32–36)
MCV RBC AUTO: 90.2 FL — SIGNIFICANT CHANGE UP (ref 80–100)
MRSA PCR RESULT.: SIGNIFICANT CHANGE UP
NRBC # BLD: 0 /100 WBCS — SIGNIFICANT CHANGE UP (ref 0–0)
PLATELET # BLD AUTO: 198 K/UL — SIGNIFICANT CHANGE UP (ref 150–400)
POTASSIUM SERPL-MCNC: 3.5 MMOL/L — SIGNIFICANT CHANGE UP (ref 3.5–5.3)
POTASSIUM SERPL-SCNC: 3.5 MMOL/L — SIGNIFICANT CHANGE UP (ref 3.5–5.3)
PROT SERPL-MCNC: 7.5 G/DL — SIGNIFICANT CHANGE UP (ref 6–8.3)
PROTHROM AB SERPL-ACNC: 11.3 SEC — SIGNIFICANT CHANGE UP (ref 9.8–12.7)
RBC # BLD: 4.19 M/UL — LOW (ref 4.2–5.8)
RBC # FLD: 13.9 % — SIGNIFICANT CHANGE UP (ref 10.3–14.5)
S AUREUS DNA NOSE QL NAA+PROBE: SIGNIFICANT CHANGE UP
SODIUM SERPL-SCNC: 141 MMOL/L — SIGNIFICANT CHANGE UP (ref 135–145)
WBC # BLD: 7.4 K/UL — SIGNIFICANT CHANGE UP (ref 3.8–10.5)
WBC # FLD AUTO: 7.4 K/UL — SIGNIFICANT CHANGE UP (ref 3.8–10.5)

## 2018-08-09 PROCEDURE — 87640 STAPH A DNA AMP PROBE: CPT

## 2018-08-09 PROCEDURE — 85730 THROMBOPLASTIN TIME PARTIAL: CPT

## 2018-08-09 PROCEDURE — 80053 COMPREHEN METABOLIC PANEL: CPT

## 2018-08-09 PROCEDURE — 86850 RBC ANTIBODY SCREEN: CPT

## 2018-08-09 PROCEDURE — 86901 BLOOD TYPING SEROLOGIC RH(D): CPT

## 2018-08-09 PROCEDURE — 85027 COMPLETE CBC AUTOMATED: CPT

## 2018-08-09 PROCEDURE — 85610 PROTHROMBIN TIME: CPT

## 2018-08-09 PROCEDURE — G0463: CPT

## 2018-08-09 PROCEDURE — 86900 BLOOD TYPING SEROLOGIC ABO: CPT

## 2018-08-09 RX ORDER — METHYLCELLULOSE 500 MG
0 TABLET ORAL
Qty: 0 | Refills: 0 | COMMUNITY

## 2018-08-09 RX ORDER — CARVEDILOL PHOSPHATE 80 MG/1
1 CAPSULE, EXTENDED RELEASE ORAL
Qty: 0 | Refills: 0 | COMMUNITY

## 2018-08-09 RX ORDER — UBIDECARENONE 100 MG
0 CAPSULE ORAL
Qty: 0 | Refills: 0 | COMMUNITY

## 2018-08-09 RX ORDER — NIFEDIPINE 30 MG
1 TABLET, EXTENDED RELEASE 24 HR ORAL
Qty: 0 | Refills: 0 | COMMUNITY

## 2018-08-09 RX ORDER — VALSARTAN 80 MG/1
1 TABLET ORAL
Qty: 0 | Refills: 0 | COMMUNITY

## 2018-08-09 RX ORDER — OMEPRAZOLE 10 MG/1
1 CAPSULE, DELAYED RELEASE ORAL
Qty: 0 | Refills: 0 | COMMUNITY

## 2018-08-09 NOTE — H&P PST ADULT - HISTORY OF PRESENT ILLNESS
74yo male patient with long history of progressively worsening left knee pain. He has had injections and physical therapy over the years with only temporary relief. He rates the pain at 7-8/10 and he is not taking anything for pain. He doesn't like taking pain medication, he tries to "work through it." He was told that TKR was recommended and he presents today for PSTs.

## 2018-08-09 NOTE — H&P PST ADULT - MUSCULOSKELETAL
detailed exam no joint swelling/diminished strength/ROM intact/no joint warmth/no joint erythema/left knee details…

## 2018-08-09 NOTE — H&P PST ADULT - PMH
BPH (benign prostatic hyperplasia)    Dyslipidemia    GERD (gastroesophageal reflux disease)    Hypertension    Osteoarthritis of right knee    Primary osteoarthritis of left knee

## 2018-08-09 NOTE — H&P PST ADULT - FAMILY HISTORY
Father  Still living? No  Family history of heart disease, Age at diagnosis: Age Unknown  Family history of myocardial infarction at age less than 60, Age at diagnosis: Age Unknown     Mother  Still living? No  Family history of dementia, Age at diagnosis: Age Unknown  Family history of hyperlipidemia, Age at diagnosis: Age Unknown

## 2018-08-09 NOTE — H&P PST ADULT - PSH
H/O left knee surgery  2007  History of hemorrhoidectomy  2012  History of tonsillectomy  childhood  Hx of arthroscopy of shoulder  left, 2003  Status post total right knee replacement  2017

## 2018-08-09 NOTE — H&P PST ADULT - ASSESSMENT
74yo male patient scheduled for surgery on 8/21/18. He will obtain Medical, Cardiac and Pulmonary Clearances. He will be NPO as per Anesthesia and will take Pepcid @ on 8/20. On AM of surgery he will take Pepcid, Metoprolol, Hydralazine, Irbesartan and Nifedipine with a sip of water. All other pre-op instructions reviewed with patient. He denies any metal allergies. 72yo male patient scheduled for surgery on 8/21/18. He will obtain Medical, Cardiac and Pulmonary Clearances. He will be NPO as per Anesthesia and will take Pepcid @ on 8/20. On AM of surgery he will take Pepcid, Metoprolol, Hydralazine, Irbesartan and Nifedipine with a sip of water. All other pre-op instructions reviewed with patient. He denies any metal allergies. He will meet with Pharmacy today.   Of note: Pt. sees Pulmonologist annually due to possible toxin exposure after 9/11, he was working in the area.

## 2018-08-13 ENCOUNTER — APPOINTMENT (OUTPATIENT)
Dept: ORTHOPEDIC SURGERY | Facility: CLINIC | Age: 73
End: 2018-08-13
Payer: MEDICARE

## 2018-08-13 VITALS — HEIGHT: 70 IN | BODY MASS INDEX: 28.2 KG/M2 | WEIGHT: 197 LBS

## 2018-08-13 PROCEDURE — 99214 OFFICE O/P EST MOD 30 MIN: CPT

## 2018-08-20 ENCOUNTER — TRANSCRIPTION ENCOUNTER (OUTPATIENT)
Age: 73
End: 2018-08-20

## 2018-08-20 RX ORDER — SODIUM CHLORIDE 9 MG/ML
1000 INJECTION, SOLUTION INTRAVENOUS
Qty: 0 | Refills: 0 | Status: DISCONTINUED | OUTPATIENT
Start: 2018-08-21 | End: 2018-08-23

## 2018-08-20 RX ORDER — POLYETHYLENE GLYCOL 3350 17 G/17G
17 POWDER, FOR SOLUTION ORAL DAILY
Qty: 0 | Refills: 0 | Status: DISCONTINUED | OUTPATIENT
Start: 2018-08-21 | End: 2018-08-23

## 2018-08-20 RX ORDER — MAGNESIUM HYDROXIDE 400 MG/1
30 TABLET, CHEWABLE ORAL DAILY
Qty: 0 | Refills: 0 | Status: DISCONTINUED | OUTPATIENT
Start: 2018-08-21 | End: 2018-08-23

## 2018-08-20 RX ORDER — DOCUSATE SODIUM 100 MG
100 CAPSULE ORAL THREE TIMES A DAY
Qty: 0 | Refills: 0 | Status: DISCONTINUED | OUTPATIENT
Start: 2018-08-21 | End: 2018-08-23

## 2018-08-20 RX ORDER — PANTOPRAZOLE SODIUM 20 MG/1
40 TABLET, DELAYED RELEASE ORAL DAILY
Qty: 0 | Refills: 0 | Status: DISCONTINUED | OUTPATIENT
Start: 2018-08-21 | End: 2018-08-23

## 2018-08-20 RX ORDER — SENNA PLUS 8.6 MG/1
2 TABLET ORAL AT BEDTIME
Qty: 0 | Refills: 0 | Status: DISCONTINUED | OUTPATIENT
Start: 2018-08-21 | End: 2018-08-23

## 2018-08-20 NOTE — PATIENT PROFILE ADULT. - MEDICATIONS TO TAKE
Problem List     None          HPI:  The patient is a 79 year old male was seen in the clinic for iron deficiency anemia, patient had diffuse gastritis, was on iron supplement to twice a day. Patient to visit the ER because of shortness breath on December 30, hemoglobin was 6.8, received 1 units of blood transfusion, with posterior transfusion hemoglobin 7.6. Patient had a to injectafer treatment since last of visit, he is here today for follow-up. Hemoglobin when up to 10.8, ferritin level is up to 200, though the iron level is still on the low side. His possible lung mass was followed by Dr. Duque. The recommendation is to get his anemia corrected, and his breathing is better and then plan on biopsy. He did feel less fatigued more energy since last of visit.    Follow-up (4 week) and Convey Results (lab 1/29/18)       A&P:  1. A significant iron deficiency anemia, oral iron in did not do much, could be secondary to his continue bleeding, and antiacid affecting iron absorption. Responding to intravenous iron treatment, schedule another injectafer treatment this week. His baseline hemoglobin is 14-15.  2. Recommend patient to have another dose of injectafer .  3. Lab and a follow-up in 6 weeks.  4. Possible right lung mass, followed by Dr. Duque, planning on biopsy after anemia improved. Text message send to Dr. Duque regarding his improvement of hemoglobin.    Patient Instructions   Schedule another injecterfa treatment this week. Cbc, ferritin, iron profile in 6 weeks, visit after lab.      New orders:  Orders Placed This Encounter   • CBC & Auto Differential   • Ferritin   • Iron and TIBC       More than 50% of the time was spent counseling and coordination of care regarding the above diagnosis.    LABORATORY DATA:    Recent Labs  Lab 01/29/18  1017 01/15/18  1250 01/04/18  1408   WBC 5.8 4.8 10.3   RBC 3.51* 2.97* 2.77*   HGB 10.8* 9.0* 8.5*   HCT 36.1* 30.1* 27.7*   .8* 101.3* 100.0    169 212    ANEUT 4.6 3.7 8.3*   ALYMS 0.6* 0.6* 0.9*   DEON 0.5 0.5 0.9   AEOS 0.1 0.1 0.2   ABASO 0.0 0.0 0.0       Recent Labs  Lab 01/01/18  0514 12/31/17  0500 12/30/17  1358  12/08/17  1245   GLUCOSE 113* 106* 112*  < > 109*   SODIUM 139 139 137  < > 140   POTASSIUM 3.7 3.3* 3.8  < > 4.3   CHLORIDE 107 105 102  < > 105   BUN 29* 45* 54*  < > 30*   CREATININE 0.94 1.08 1.17  < > 1.16   CALCIUM 7.9* 7.7* 8.4  < > 9.0   ALBUMIN 2.7* 2.9*  --   --  3.5*   AST 15 17  --   --  21   ALKPT 52 49  --   --  66   GPT 22 24  --   --  19   MG  --   --  2.3  --  2.2   < > = values in this interval not displayed.  Recent Labs  Lab 01/01/18  0514 12/31/17  0500 12/30/17  1358  12/08/17  1245   ANIONGAP 12 13 15  < > 11   GLOB 2.4 2.2  --   --  3.3   < > = values in this interval not displayed.    MEDICATIONS:  Current Outpatient Prescriptions   Medication Sig Dispense Refill   • torsemide (DEMADEX) 20 MG tablet Take 1 tablet by mouth daily.     • lisinopril (ZESTRIL) 10 MG tablet Take a half tablet by mouth daily. 45 tablet 0   • potassium chloride (MICRO-K) 10 MEQ CR capsule Take 1 capsule by mouth daily. 90 capsule 0   • budesonide (PULMICORT) 0.5 MG/2ML nebulizer suspension Take 2 mLs by nebulization 2 times daily. 60 mL 6   • albuterol-ipratropium (COMBIVENT RESPIMAT) 100-20 MCG/ACT inhaler Inhale 1 puff into the lungs every 4 hours as needed for Shortness of Breath. 4 g 5   • levothyroxine (SYNTHROID, LEVOTHROID) 75 MCG tablet Take 1 tablet by mouth daily (before breakfast). 30 tablet 3   • nadolol (CORGARD) 40 MG tablet Take 0.5 tablets by mouth daily. 90 tablet 0   • pantoprazole (PROTONIX) 40 MG tablet Take 1 tablet by mouth nightly. 30 tablet 3   • albuterol-ipratropium 2.5 mg/0.5 mg (DUONEB) 0.5-2.5 (3) MG/3ML nebulizer solution Take 3 mLs by nebulization every 6 hours as needed for Wheezing. 360 mL 12   • warfarin (COUMADIN) 5 MG tablet Take 1 tablet by mouth 4 days a week and a half tablet 3 days a week or as directed 12  tablet 0   • atorvastatin (LIPITOR) 40 MG tablet TAKE 1 TABLET BY MOUTH  DAILY 90 tablet 0   • GLUCOSAMINE-VITAMIN D PO Take 1 tablet by mouth daily.      • aspirin 81 MG tablet Take 1 tablet by mouth daily.     • folic acid (FOLATE) 400 MCG tablet Take 1 tablet by mouth daily.     • spironolactone (ALDACTONE) 25 MG tablet Take 1 tablet by mouth daily. 90 tablet 2   • montelukast (SINGULAIR) 10 MG tablet Take 1 tablet by mouth every evening. 30 tablet 5     No current facility-administered medications for this visit.        Past Surgical History:   Procedure Laterality Date   • BACK SURGERY     • CARDIAC SURGERY     • COLONOSCOPY  2011    Dr DOMINICK castro   • COLONOSCOPY  10/31/2017    Jeevan   • CORONARY ARTERY BYPASS GRAFT     • ESOPHAGOGASTRODUODENOSCOPY  10/31/2017    Jeevan   • LAPAROSCOPIC CHOLECYSTECTOMY  2008    Dr Chew   • PTCA     • REMOVAL GALLBLADDER     • REMOVAL OF TONSILS,<14 Y/O     • SKIN BIOPSY     • SPINAL FUSION         Family History   Problem Relation Age of Onset   • Cancer Paternal Aunt      ?stomach - step aunt   • Heart disease Paternal Uncle    • Myocardial Infarction Paternal Uncle    • Early death Paternal Grandfather      black lung disease   • Asthma Paternal Grandfather    • Myocardial Infarction Paternal Aunt    • Arthritis Mother    • Depression Mother    • Arthritis Father    • Heart disease Maternal Uncle    • Myocardial Infarction Maternal Uncle    • COPD Sister        Social History     Social History   • Marital status:      Spouse name: N/A   • Number of children: N/A   • Years of education: N/A     Social History Main Topics   • Smoking status: Former Smoker     Packs/day: 3.00     Years: 24.00     Types: Cigarettes     Quit date: 1/1/1973   • Smokeless tobacco: Never Used   • Alcohol use 3.6 oz/week     6 Standard drinks or equivalent per week      Comment: 6 drinks on average per week, not for several weeks   • Drug use: No   • Sexual activity: No     Other Topics  Concern   • Seat Belt Yes     Social History Narrative   • None        Past Medical History:   Diagnosis Date   • Allergy    • Anemia    • Arthritis    • ASHD (arteriosclerotic heart disease)     S/P PTCS 1995; CABG 1997   • Blood transfusion without reported diagnosis    • Chronic diastolic heart failure (CMS/HCC) 1/20/2017   • Chronic edema    • Clotting disorder (CMS/HCC)    • Dermatitis    • Erectile dysfunction    • Hyperlipidemia    • Hypertension    • Impetigo any site    • Ischemia, myocardial, chronic    • Keratosis, actinic    • Malignant neoplasm (CMS/HCC)    • Panlobular emphysema (CMS/HCC) 1/19/2017   • RAD (reactive airway disease)    • Squamous cell carcinoma    • Thyroid disease         Visit Vitals  /59 (BP Location: [facility], Patient Position: Sitting, Cuff Size: Large Adult)   Pulse 59   Temp 97.3 °F (36.3 °C) (Temporal Artery)   Resp 20   Ht 5' 10\" (1.778 m)   Wt 112.9 kg   BMI 35.71 kg/m²       Performance status = 1 - No physically strenuous activity, but ambulatory and able to carry out light or sedentary work.    REVIEW OF SYSTEMS:     CONSTITUTIONAL:  There is no history of fevers, weight loss or night sweats.  less fatigue and tiredness  CARDIAC:  No history of chest pain, palpitations, SOB, PND or orthopnea.    RESPIRATORY:  No history of cough, sputum production or hemoptysis.    GASTROINTESTINAL:  No history of nausea, vomiting, diarrhea, abdominal pains or changes in bowel habits.    GENITOURINARY:  No history of dysuria, hematuria or incontinence.    MUSCULOSKELETAL:  No history of joint pain, immobility or loss of function.    SKIN AND EXTREMITIES:  The patient denies rash, itching, easy bruisability, masses or skin lesions.    NEUROLOGICAL:  No history of dizziness, localized weakness, sensory disturbance or loss of consciousness.    PSYCHIATRIC: No mood changes or depression    PHYSICAL EXAM:  O. The patient is a very pleasant significant overweight,   EYES: PERRL. Normal eye  metoprolol movements. No scleral icterus or conjunctival pallor.   ENT: No lesions, masses or discharge in ears or nose.   MOUTH. Mouth and throat do not show any exudate, thrush, ulcers or hemorrhages.   NECK: Trachea central. No masses. No thyroid enlargement or lymphadenopathy.   RESPIRATORY: Normal chest expansion and percussion note. Auscultation reveals normal breath sounds. No added sounds.   CARDIOVASCULAR: Normal rhythm and rate. No JVD. PMI normal. Heart sounds 1 and 2, no murmurs, rubs or gallops.   ABDOMEN: Soft, nontender. No masses felt. No hepatosplenomegaly.   MUSCULOSKELETAL: On wheelchair, generalized weakness  NEUROLOGIC: alert and orient x 3, no numbness and tinglings.   SKIN: No rashes, lesions or ulcers. No petechiae or hemorrhages.   BILATERAL UPPER AND LOWER EXTREMITIES: No clubbing, cyanosis or edema.   LYMPHATIC: No lymphadenopathy detected in neck and axillary regions.       Advised to continue health maintenance examinations per protocol through the primary MD.     The patient's questions were answered satisfactorily. The patient was encouraged to call with any questions prior to his next planned appointment.

## 2018-08-21 ENCOUNTER — INPATIENT (INPATIENT)
Facility: HOSPITAL | Age: 73
LOS: 1 days | Discharge: HOME | DRG: 470 | End: 2018-08-23
Attending: SPECIALIST | Admitting: SPECIALIST
Payer: MEDICARE

## 2018-08-21 ENCOUNTER — RESULT REVIEW (OUTPATIENT)
Age: 73
End: 2018-08-21

## 2018-08-21 ENCOUNTER — TRANSCRIPTION ENCOUNTER (OUTPATIENT)
Age: 73
End: 2018-08-21

## 2018-08-21 ENCOUNTER — APPOINTMENT (OUTPATIENT)
Dept: ORTHOPEDIC SURGERY | Facility: HOSPITAL | Age: 73
End: 2018-08-21
Payer: MEDICARE

## 2018-08-21 VITALS
SYSTOLIC BLOOD PRESSURE: 125 MMHG | DIASTOLIC BLOOD PRESSURE: 56 MMHG | WEIGHT: 196.43 LBS | TEMPERATURE: 98 F | OXYGEN SATURATION: 97 % | HEART RATE: 69 BPM | HEIGHT: 71 IN | RESPIRATION RATE: 18 BRPM

## 2018-08-21 DIAGNOSIS — N40.0 BENIGN PROSTATIC HYPERPLASIA WITHOUT LOWER URINARY TRACT SYMPTOMS: ICD-10-CM

## 2018-08-21 DIAGNOSIS — Z01.818 ENCOUNTER FOR OTHER PREPROCEDURAL EXAMINATION: ICD-10-CM

## 2018-08-21 DIAGNOSIS — I10 ESSENTIAL (PRIMARY) HYPERTENSION: ICD-10-CM

## 2018-08-21 DIAGNOSIS — Z96.651 PRESENCE OF RIGHT ARTIFICIAL KNEE JOINT: Chronic | ICD-10-CM

## 2018-08-21 DIAGNOSIS — Z90.89 ACQUIRED ABSENCE OF OTHER ORGANS: Chronic | ICD-10-CM

## 2018-08-21 DIAGNOSIS — M17.12 UNILATERAL PRIMARY OSTEOARTHRITIS, LEFT KNEE: ICD-10-CM

## 2018-08-21 DIAGNOSIS — K21.9 GASTRO-ESOPHAGEAL REFLUX DISEASE WITHOUT ESOPHAGITIS: ICD-10-CM

## 2018-08-21 DIAGNOSIS — Z98.890 OTHER SPECIFIED POSTPROCEDURAL STATES: Chronic | ICD-10-CM

## 2018-08-21 DIAGNOSIS — E78.5 HYPERLIPIDEMIA, UNSPECIFIED: ICD-10-CM

## 2018-08-21 LAB
ANION GAP SERPL CALC-SCNC: 7 MMOL/L — SIGNIFICANT CHANGE UP (ref 5–17)
BUN SERPL-MCNC: 24 MG/DL — HIGH (ref 7–23)
CALCIUM SERPL-MCNC: 9.2 MG/DL — SIGNIFICANT CHANGE UP (ref 8.4–10.5)
CHLORIDE SERPL-SCNC: 103 MMOL/L — SIGNIFICANT CHANGE UP (ref 96–108)
CO2 SERPL-SCNC: 27 MMOL/L — SIGNIFICANT CHANGE UP (ref 22–31)
CREAT SERPL-MCNC: 0.97 MG/DL — SIGNIFICANT CHANGE UP (ref 0.5–1.3)
GLUCOSE SERPL-MCNC: 168 MG/DL — HIGH (ref 70–99)
HCT VFR BLD CALC: 37.2 % — LOW (ref 39–50)
HGB BLD-MCNC: 12.6 G/DL — LOW (ref 13–17)
POTASSIUM SERPL-MCNC: 3.8 MMOL/L — SIGNIFICANT CHANGE UP (ref 3.5–5.3)
POTASSIUM SERPL-SCNC: 3.8 MMOL/L — SIGNIFICANT CHANGE UP (ref 3.5–5.3)
SODIUM SERPL-SCNC: 137 MMOL/L — SIGNIFICANT CHANGE UP (ref 135–145)

## 2018-08-21 PROCEDURE — 27447 TOTAL KNEE ARTHROPLASTY: CPT | Mod: LT

## 2018-08-21 PROCEDURE — 88311 DECALCIFY TISSUE: CPT | Mod: 26

## 2018-08-21 PROCEDURE — 99223 1ST HOSP IP/OBS HIGH 75: CPT

## 2018-08-21 PROCEDURE — 88305 TISSUE EXAM BY PATHOLOGIST: CPT | Mod: 26

## 2018-08-21 PROCEDURE — 73562 X-RAY EXAM OF KNEE 3: CPT | Mod: 26,LT

## 2018-08-21 RX ORDER — OXYCODONE HYDROCHLORIDE 5 MG/1
5 TABLET ORAL ONCE
Qty: 0 | Refills: 0 | Status: DISCONTINUED | OUTPATIENT
Start: 2018-08-21 | End: 2018-08-21

## 2018-08-21 RX ORDER — SODIUM CHLORIDE 9 MG/ML
1000 INJECTION, SOLUTION INTRAVENOUS
Qty: 0 | Refills: 0 | Status: DISCONTINUED | OUTPATIENT
Start: 2018-08-21 | End: 2018-08-21

## 2018-08-21 RX ORDER — ATORVASTATIN CALCIUM 80 MG/1
40 TABLET, FILM COATED ORAL AT BEDTIME
Qty: 0 | Refills: 0 | Status: DISCONTINUED | OUTPATIENT
Start: 2018-08-21 | End: 2018-08-23

## 2018-08-21 RX ORDER — NIFEDIPINE 30 MG
60 TABLET, EXTENDED RELEASE 24 HR ORAL DAILY
Qty: 0 | Refills: 0 | Status: DISCONTINUED | OUTPATIENT
Start: 2018-08-21 | End: 2018-08-23

## 2018-08-21 RX ORDER — NIFEDIPINE 30 MG
60 TABLET, EXTENDED RELEASE 24 HR ORAL DAILY
Qty: 0 | Refills: 0 | Status: DISCONTINUED | OUTPATIENT
Start: 2018-08-21 | End: 2018-08-21

## 2018-08-21 RX ORDER — ACETAMINOPHEN 500 MG
1000 TABLET ORAL EVERY 6 HOURS
Qty: 0 | Refills: 0 | Status: COMPLETED | OUTPATIENT
Start: 2018-08-21 | End: 2018-08-22

## 2018-08-21 RX ORDER — CELECOXIB 200 MG/1
200 CAPSULE ORAL
Qty: 0 | Refills: 0 | Status: DISCONTINUED | OUTPATIENT
Start: 2018-08-22 | End: 2018-08-23

## 2018-08-21 RX ORDER — ACETAMINOPHEN 500 MG
1000 TABLET ORAL ONCE
Qty: 0 | Refills: 0 | Status: COMPLETED | OUTPATIENT
Start: 2018-08-21 | End: 2018-08-21

## 2018-08-21 RX ORDER — METOPROLOL TARTRATE 50 MG
50 TABLET ORAL DAILY
Qty: 0 | Refills: 0 | Status: DISCONTINUED | OUTPATIENT
Start: 2018-08-21 | End: 2018-08-21

## 2018-08-21 RX ORDER — ONDANSETRON 8 MG/1
4 TABLET, FILM COATED ORAL ONCE
Qty: 0 | Refills: 0 | Status: DISCONTINUED | OUTPATIENT
Start: 2018-08-21 | End: 2018-08-21

## 2018-08-21 RX ORDER — ASPIRIN/CALCIUM CARB/MAGNESIUM 324 MG
1 TABLET ORAL
Qty: 82 | Refills: 0 | OUTPATIENT
Start: 2018-08-21 | End: 2018-09-30

## 2018-08-21 RX ORDER — TRANEXAMIC ACID 100 MG/ML
1000 INJECTION, SOLUTION INTRAVENOUS ONCE
Qty: 0 | Refills: 0 | Status: COMPLETED | OUTPATIENT
Start: 2018-08-21 | End: 2018-08-21

## 2018-08-21 RX ORDER — HYDROMORPHONE HYDROCHLORIDE 2 MG/ML
0.5 INJECTION INTRAMUSCULAR; INTRAVENOUS; SUBCUTANEOUS
Qty: 0 | Refills: 0 | Status: DISCONTINUED | OUTPATIENT
Start: 2018-08-21 | End: 2018-08-23

## 2018-08-21 RX ORDER — DOCUSATE SODIUM 100 MG
1 CAPSULE ORAL
Qty: 0 | Refills: 0 | COMMUNITY
Start: 2018-08-21

## 2018-08-21 RX ORDER — OXYCODONE HYDROCHLORIDE 5 MG/1
5 TABLET ORAL
Qty: 0 | Refills: 0 | Status: DISCONTINUED | OUTPATIENT
Start: 2018-08-21 | End: 2018-08-23

## 2018-08-21 RX ORDER — DOXAZOSIN MESYLATE 4 MG
4 TABLET ORAL AT BEDTIME
Qty: 0 | Refills: 0 | Status: DISCONTINUED | OUTPATIENT
Start: 2018-08-21 | End: 2018-08-23

## 2018-08-21 RX ORDER — CEFAZOLIN SODIUM 1 G
2000 VIAL (EA) INJECTION EVERY 8 HOURS
Qty: 0 | Refills: 0 | Status: COMPLETED | OUTPATIENT
Start: 2018-08-21 | End: 2018-08-22

## 2018-08-21 RX ORDER — TRANEXAMIC ACID 100 MG/ML
1000 INJECTION, SOLUTION INTRAVENOUS EVERY 6 HOURS
Qty: 0 | Refills: 0 | Status: DISCONTINUED | OUTPATIENT
Start: 2018-08-21 | End: 2018-08-21

## 2018-08-21 RX ORDER — ASPIRIN/CALCIUM CARB/MAGNESIUM 324 MG
81 TABLET ORAL EVERY 12 HOURS
Qty: 0 | Refills: 0 | Status: DISCONTINUED | OUTPATIENT
Start: 2018-08-22 | End: 2018-08-23

## 2018-08-21 RX ORDER — APREPITANT 80 MG/1
40 CAPSULE ORAL ONCE
Qty: 0 | Refills: 0 | Status: COMPLETED | OUTPATIENT
Start: 2018-08-21 | End: 2018-08-21

## 2018-08-21 RX ORDER — PANTOPRAZOLE SODIUM 20 MG/1
1 TABLET, DELAYED RELEASE ORAL
Qty: 30 | Refills: 1 | OUTPATIENT
Start: 2018-08-21 | End: 2018-10-19

## 2018-08-21 RX ORDER — CEFAZOLIN SODIUM 1 G
2000 VIAL (EA) INJECTION ONCE
Qty: 0 | Refills: 0 | Status: COMPLETED | OUTPATIENT
Start: 2018-08-21 | End: 2018-08-21

## 2018-08-21 RX ORDER — ATORVASTATIN CALCIUM 80 MG/1
40 TABLET, FILM COATED ORAL AT BEDTIME
Qty: 0 | Refills: 0 | Status: DISCONTINUED | OUTPATIENT
Start: 2018-08-21 | End: 2018-08-21

## 2018-08-21 RX ORDER — ACETAMINOPHEN 500 MG
2 TABLET ORAL
Qty: 0 | Refills: 0 | COMMUNITY
Start: 2018-08-21 | End: 2018-09-04

## 2018-08-21 RX ORDER — FENTANYL CITRATE 50 UG/ML
25 INJECTION INTRAVENOUS
Qty: 0 | Refills: 0 | Status: DISCONTINUED | OUTPATIENT
Start: 2018-08-21 | End: 2018-08-21

## 2018-08-21 RX ORDER — HYDRALAZINE HCL 50 MG
50 TABLET ORAL
Qty: 0 | Refills: 0 | Status: DISCONTINUED | OUTPATIENT
Start: 2018-08-22 | End: 2018-08-23

## 2018-08-21 RX ORDER — ACETAMINOPHEN 500 MG
1000 TABLET ORAL EVERY 8 HOURS
Qty: 0 | Refills: 0 | Status: DISCONTINUED | OUTPATIENT
Start: 2018-08-22 | End: 2018-08-23

## 2018-08-21 RX ORDER — DOXAZOSIN MESYLATE 4 MG
4 TABLET ORAL AT BEDTIME
Qty: 0 | Refills: 0 | Status: DISCONTINUED | OUTPATIENT
Start: 2018-08-21 | End: 2018-08-21

## 2018-08-21 RX ORDER — CELECOXIB 200 MG/1
1 CAPSULE ORAL
Qty: 60 | Refills: 0 | OUTPATIENT
Start: 2018-08-21 | End: 2018-09-19

## 2018-08-21 RX ORDER — POLYETHYLENE GLYCOL 3350 17 G/17G
17 POWDER, FOR SOLUTION ORAL
Qty: 0 | Refills: 0 | COMMUNITY
Start: 2018-08-21

## 2018-08-21 RX ORDER — SENNA PLUS 8.6 MG/1
2 TABLET ORAL
Qty: 0 | Refills: 0 | COMMUNITY
Start: 2018-08-21

## 2018-08-21 RX ORDER — HYDRALAZINE HCL 50 MG
50 TABLET ORAL
Qty: 0 | Refills: 0 | Status: DISCONTINUED | OUTPATIENT
Start: 2018-08-21 | End: 2018-08-21

## 2018-08-21 RX ORDER — METOPROLOL TARTRATE 50 MG
50 TABLET ORAL DAILY
Qty: 0 | Refills: 0 | Status: DISCONTINUED | OUTPATIENT
Start: 2018-08-21 | End: 2018-08-23

## 2018-08-21 RX ORDER — CHLORHEXIDINE GLUCONATE 213 G/1000ML
1 SOLUTION TOPICAL ONCE
Qty: 0 | Refills: 0 | Status: COMPLETED | OUTPATIENT
Start: 2018-08-21 | End: 2018-08-21

## 2018-08-21 RX ORDER — UBIDECARENONE 100 MG
1 CAPSULE ORAL
Qty: 0 | Refills: 0 | COMMUNITY

## 2018-08-21 RX ORDER — FAMOTIDINE 10 MG/ML
0 INJECTION INTRAVENOUS
Qty: 0 | Refills: 0 | COMMUNITY

## 2018-08-21 RX ORDER — OXYCODONE HYDROCHLORIDE 5 MG/1
10 TABLET ORAL
Qty: 0 | Refills: 0 | Status: DISCONTINUED | OUTPATIENT
Start: 2018-08-21 | End: 2018-08-23

## 2018-08-21 RX ADMIN — ATORVASTATIN CALCIUM 40 MILLIGRAM(S): 80 TABLET, FILM COATED ORAL at 21:47

## 2018-08-21 RX ADMIN — Medication 100 MILLIGRAM(S): at 21:46

## 2018-08-21 RX ADMIN — SENNA PLUS 2 TABLET(S): 8.6 TABLET ORAL at 21:46

## 2018-08-21 RX ADMIN — SODIUM CHLORIDE 60 MILLILITER(S): 9 INJECTION, SOLUTION INTRAVENOUS at 15:00

## 2018-08-21 RX ADMIN — Medication 1000 MILLIGRAM(S): at 19:23

## 2018-08-21 RX ADMIN — APREPITANT 40 MILLIGRAM(S): 80 CAPSULE ORAL at 09:33

## 2018-08-21 RX ADMIN — CHLORHEXIDINE GLUCONATE 1 APPLICATION(S): 213 SOLUTION TOPICAL at 09:33

## 2018-08-21 RX ADMIN — Medication 50 MILLIGRAM(S): at 22:25

## 2018-08-21 RX ADMIN — Medication 100 MILLIGRAM(S): at 20:40

## 2018-08-21 RX ADMIN — Medication 400 MILLIGRAM(S): at 19:22

## 2018-08-21 RX ADMIN — Medication 4 MILLIGRAM(S): at 21:47

## 2018-08-21 NOTE — PHYSICAL THERAPY INITIAL EVALUATION ADULT - RANGE OF MOTION EXAMINATION, REHAB EVAL
Right LE ROM was WFL (within functional limits)/left knee flex ~60 degrees/bilateral upper extremity ROM was WFL (within functional limits)/deficits as listed below

## 2018-08-21 NOTE — DISCHARGE NOTE ADULT - HOSPITAL COURSE
This patient was admitted to [facility] with a history of severe degenerative joint disease of the left knee.  Patient went to Pre-Surgical Testing at [facility] and was medically cleared to undergo elective procedure. Patient underwent Left TKR by Dr. Lalito Rojas on 8/21/18. Procedure was well tolerated.  No operative or richard-operative complications arose during patients hospital course.  Patient received antibiotic according to SCIP guidelines for infection prevention. Aspirin was given for DVT prophylaxis.  Anesthesia, Medical Hospitalist, Physical Therapy and Occupational Therapy were consulted. Patient is stable for discharge with a good prognosis.  Appropriate discharge instructions and medications are provided in this document.

## 2018-08-21 NOTE — DISCHARGE NOTE ADULT - MEDICATION SUMMARY - MEDICATIONS TO TAKE
I will START or STAY ON the medications listed below when I get home from the hospital:    CPM machine Left Knee  -- Dx: s/p Left TKR,ICD:Z96.652,ANDREW:apply for at least 2 weeks.CPM start date: 8/22/18,Apply 2 times a day for 2 hour sessions ,start 0-55 degrees,advance 0-10 degrees as tolerated, max 110 degrees flexion.    -- Indication: For Equipement    oxyCODONE 5 mg oral tablet  -- 1 tab(s) by mouth every 4 hours, As Needed MDD:8 tablets--- istop # [id number]  -- Caution federal law prohibits the transfer of this drug to any person other  than the person for whom it was prescribed.  It is very important that you take or use this exactly as directed.  Do not skip doses or discontinue unless directed by your doctor.  May cause drowsiness.  Alcohol may intensify this effect.  Use care when operating dangerous machinery.  This prescription cannot be refilled.  Using more of this medication than prescribed may cause serious breathing problems.    -- Indication: For Pain prn    acetaminophen 500 mg oral tablet  -- 2 tab(s) by mouth every 8 hours  -- Indication: For Pain prn    celecoxib 200 mg oral capsule  -- 1 cap(s) by mouth 2 times a day  -- Indication: For Pain and swelling    aspirin 81 mg oral delayed release tablet  -- 1 tab(s) by mouth every 12 hours  -- Indication: For Prevent blood clots    irbesartan 300 mg oral tablet  -- 1 tab(s) by mouth once a day  -- Indication: For htn    doxazosin 4 mg oral tablet  -- 1 tab(s) by mouth once a day (at bedtime)  -- Indication: For htn    Lipitor 40 mg oral tablet  -- 1 tab(s) by mouth once a day  -- Indication: For high cholesterol    Dyazide 37.5 mg-25 mg oral capsule  -- 1 cap(s) by mouth once a day  -- Indication: For htn    Metoprolol Succinate ER 50 mg oral tablet, extended release  -- 1 tab(s) by mouth once a day  -- Indication: For htn    Nifedical XL 60 mg oral tablet, extended release  -- 1 tab(s) by mouth 2 times a day  -- Indication: For htn    senna oral tablet  -- 2 tab(s) by mouth once a day (at bedtime)  -- Indication: For constipation    docusate sodium 100 mg oral capsule  -- 1 cap(s) by mouth 3 times a day  -- Indication: For constipation    polyethylene glycol 3350 oral powder for reconstitution  -- 17 gram(s) by mouth once a day, As needed, Constipation  -- Indication: For constipation    pantoprazole 40 mg oral delayed release tablet  -- 1 tab(s) by mouth once a day  -- Indication: For reflux    hydrALAZINE 50 mg oral tablet  --  by mouth 2 times a day  -- Indication: For htn    Multiple Vitamins oral tablet  -- 1 tab(s) by mouth once a day  -- Indication: For supp    Vitamin C 500 mg oral tablet  -- 1 tab(s) by mouth once a day  -- Indication: For supp

## 2018-08-21 NOTE — DISCHARGE NOTE ADULT - PATIENT PORTAL LINK FT
You can access the [facility] Patient Portal, offered by [facility], by registering with the following website: [email or web address]

## 2018-08-21 NOTE — CONSULT NOTE ADULT - PROBLEM SELECTOR RECOMMENDATION 9
Pain Management: acceptable- continue current care Tylenol ATC/Celebrex ATC/ Oxycodone PRN  Continue PT/OT  DVT proph: [x] low risk - Aspirin    DC plan:  [x] Home with HC

## 2018-08-21 NOTE — PROGRESS NOTE ADULT - SUBJECTIVE AND OBJECTIVE BOX
Orthopaedic Post Op Note    Procedure: Left TKR  Surgeon: Daniel Griffith    73y Male comfortable, without complaints.  Pain is "nonexistent." Reported pain score = 0  Denies N/V, CP, SOB, numbness/tingling of extremities.    PE:  Vital Signs Last 24 Hrs  T(C): 36.7 (21 Aug 2018 16:05), Max: 36.8 (21 Aug 2018 09:34)  T(F): 98 (21 Aug 2018 16:05), Max: 98.3 (21 Aug 2018 09:34)  HR: 65 (21 Aug 2018 16:05) (64 - 78)  BP: 125/64 (21 Aug 2018 16:05) (102/47 - 132/54)  RR: 16 (21 Aug 2018 16:05) (12 - 18)  SpO2: 98% (21 Aug 2018 16:05) (95% - 100%)  General: Pt alert and oriented   Lungs: + BS CTA bilaterally  Heart: +S1 & S2 heard, RRR  Abd: + BS heard, soft, NT, ND  Left Knee Dressing: C/D/I, functioning hemovac in place  Bilateral LEs:  Motor:   5/5 dorsiflexion, plantarflexion, EHL  Sensation intact to LT   2+ DP Pulses    A/P: 73y Male POD#0 s/p Left TKR  - Stable  - Acetaminophen, Celebrex, Dilaudid/Oxycodone for Pain Control   - DVT ppx: aspirin  - Carol op IV abx: ancef  - PT, OT per protocol  - F/U AM Labs  DCP = home Thursday

## 2018-08-21 NOTE — DISCHARGE NOTE ADULT - INSTRUCTIONS
none  For Constipation :   • Increase your water intake. Drink at least 8 glasses of water daily.  • Try adding fiber to your diet by eating fruits, vegetables and foods that are rich in grains.  • If you do experience constipation, you may take an over-the-counter stool softener/laxative such as Carol Colace, [place] or  Milk of Magnesia. left knee

## 2018-08-21 NOTE — PHYSICAL THERAPY INITIAL EVALUATION ADULT - ADDITIONAL COMMENTS
Lives in house with spouse.  5 stairs to enter with railing; 14 inside with railing.  Has stall shower, rolling walker and cane.

## 2018-08-21 NOTE — DISCHARGE NOTE ADULT - PLAN OF CARE
Improve ambulation, ADLs and quality of life Physical Therapy/Occupational Therapy for: ambulation, transfers, stairs, ADL's (activities of daily living), range of motion exercises, and isometrics  -Activity  • Weight Bearing as tolerated with rolling walker.  • Take short, frequent walks increasing the distance that you walk each day as tolerated.  • Change your position every hour to decrease pain and stiffness.  • Continue the exercises taught to you by your physical therapist.  • No driving until cleared by the doctor.  • No tub baths, hot tubs, or swimming pools until instructed by your doctor.  • Do not squat down on the floor.  • Do not kneel or twist your knee.  • Range of Motion Goals: Flexion= 120 degrees, Extension = 0 degrees  Keep incision clean and dry. May shower 5 days after surgery if no drainage from incision. Prineo removal 2 weeks after surgery at Surgeon's office.   - Call your doctor if you experience:  • An increase in pain not controlled by pain medication or change in activity or position.  • Temperature greater than 101° F.  • Redness, increased swelling or foul smelling drainage from or around the incision.  • Numbness, tingling or a change in color or temperature of the operative leg.  • Call your doctor immediately if you experience chest pain, shortness of breath or calf pain. - Use CPM 2 hours, 2 times a day  - Start 0-60 degrees  - Advance 5-10 degrees each session as tolerated  to goal 110 degrees  - Consider Pain meds prior to CPM  -  Apply ice to knee Post CPM

## 2018-08-21 NOTE — DISCHARGE NOTE ADULT - CARE PLAN
Principal Discharge DX:	Primary osteoarthritis of left knee  Goal:	Improve ambulation, ADLs and quality of life  Assessment and plan of treatment:	Physical Therapy/Occupational Therapy for: ambulation, transfers, stairs, ADL's (activities of daily living), range of motion exercises, and isometrics  -Activity  • Weight Bearing as tolerated with rolling walker.  • Take short, frequent walks increasing the distance that you walk each day as tolerated.  • Change your position every hour to decrease pain and stiffness.  • Continue the exercises taught to you by your physical therapist.  • No driving until cleared by the doctor.  • No tub baths, hot tubs, or swimming pools until instructed by your doctor.  • Do not squat down on the floor.  • Do not kneel or twist your knee.  • Range of Motion Goals: Flexion= 120 degrees, Extension = 0 degrees  Keep incision clean and dry. May shower 5 days after surgery if no drainage from incision. Prineo removal 2 weeks after surgery at Surgeon's office.   - Call your doctor if you experience:  • An increase in pain not controlled by pain medication or change in activity or position.  • Temperature greater than 101° F.  • Redness, increased swelling or foul smelling drainage from or around the incision.  • Numbness, tingling or a change in color or temperature of the operative leg.  • Call your doctor immediately if you experience chest pain, shortness of breath or calf pain.  Assessment and plan of treatment:	- Use CPM 2 hours, 2 times a day  - Start 0-60 degrees  - Advance 5-10 degrees each session as tolerated  to goal 110 degrees  - Consider Pain meds prior to CPM  -  Apply ice to knee Post CPM

## 2018-08-21 NOTE — DISCHARGE NOTE ADULT - CARE PROVIDER_API CALL
Lalito Rojas), Orthopaedic Surgery  [address] [place] [address]  Phone: [phone]  Fax: [phone]

## 2018-08-21 NOTE — BRIEF OPERATIVE NOTE - PROCEDURE
<<-----Click on this checkbox to enter Procedure Left total knee arthroplasty  08/21/2018    Active  Gilberto Swift

## 2018-08-21 NOTE — CONSULT NOTE ADULT - SUBJECTIVE AND OBJECTIVE BOX
Patient is a 73y old  Male who presents with a chief complaint of Left Knee Pain (20 Aug 2018 16:41)      HPI: 73M presented with long history of progressively worsening left knee pain. He has had injections and physical therapy over the years with only temporary relief. He rates the pain at 7-8/10 and he is not taking anything for pain. He doesn't like taking pain medication, he tries to "work through it." He is now s/p TKR - left.  Patient's biggest complaint is that he wants his food tray (coming from kitchen now).   pain is controlled.       REVIEW OF SYSTEMS:  CONSTITUTIONAL: No fever, weight loss, or fatigue  EYES: No eye pain, visual disturbances, or discharge  ENMT:  No difficulty hearing, tinnitus, vertigo; No sinus or throat pain  NECK: No pain or stiffness  BREASTS: No pain, masses, or nipple discharge  RESPIRATORY: No cough, wheezing, chills or hemoptysis; No shortness of breath  CARDIOVASCULAR: No chest pain, palpitations, dizziness, or leg swelling  GASTROINTESTINAL: No abdominal or epigastric pain. No nausea, vomiting, or hematemesis; No diarrhea or constipation. No melena or hematochezia.  GENITOURINARY: No dysuria, frequency, hematuria, or incontinence  NEUROLOGICAL: No headaches, memory loss, loss of strength, numbness, or tremors  SKIN: No itching, burning, rashes, or lesions   LYMPH NODES: No enlarged glands  ENDOCRINE: No heat or cold intolerance; No hair loss  MUSCULOSKELETAL: No muscle or back pain  PSYCHIATRIC: No depression, anxiety, mood swings, or difficulty sleeping  HEME/LYMPH: No easy bruising, or bleeding gums  ALLERGY AND IMMUNOLOGIC: No hives or eczema    PAST MEDICAL & SURGICAL HISTORY:  Primary osteoarthritis of left knee  BPH (benign prostatic hyperplasia)  Osteoarthritis of right knee  Dyslipidemia  GERD (gastroesophageal reflux disease)  Hypertension  Status post total right knee replacement: 2017  History of tonsillectomy: childhood  History of hemorrhoidectomy: 2012  Hx of arthroscopy of shoulder: left, 2003  H/O left knee surgery: 2007      SOCIAL HISTORY:  Residence: [ ] [facility]  [ ] SNF  [ x] Community  [ ] Substance abuse: denies  [ ] Tobacco: denies  [ ] Alcohol use: wine several times a week.     Allergies  No Known Allergies    MEDICATIONS  (STANDING):  acetaminophen  IVPB. 1000 milliGRAM(s) IV Intermittent every 6 hours  atorvastatin 40 milliGRAM(s) Oral at bedtime  ceFAZolin   IVPB 2000 milliGRAM(s) IV Intermittent every 8 hours  docusate sodium 100 milliGRAM(s) Oral three times a day  doxazosin 4 milliGRAM(s) Oral at bedtime  lactated ringers. 1000 milliLiter(s) (100 mL/Hr) IV Continuous <Continuous>  metoprolol succinate ER 50 milliGRAM(s) Oral daily  NIFEdipine XL 60 milliGRAM(s) Oral daily  pantoprazole    Tablet 40 milliGRAM(s) Oral daily  senna 2 Tablet(s) Oral at bedtime    MEDICATIONS  (PRN):  aluminum hydroxide/magnesium hydroxide/simethicone Suspension 30 milliLiter(s) Oral four times a day PRN Indigestion  HYDROmorphone  Injectable 0.5 milliGRAM(s) IV Push every 3 hours PRN Severe Pain (7 - 10)  magnesium hydroxide Suspension 30 milliLiter(s) Oral daily PRN Constipation  oxyCODONE    IR 5 milliGRAM(s) Oral every 3 hours PRN Mild Pain (1 - 3)  oxyCODONE    IR 10 milliGRAM(s) Oral every 3 hours PRN Moderate Pain (4 - 6)  polyethylene glycol 3350 17 Gram(s) Oral daily PRN Constipation      FAMILY HISTORY:  Family history of hyperlipidemia (Mother)  Family history of dementia (Mother)  Family history of myocardial infarction at age less than 60 (Father)  Family history of heart disease (Father)      Vital Signs Last 24 Hrs  T(C): 36.7 (21 Aug 2018 16:05), Max: 36.8 (21 Aug 2018 09:34)  T(F): 98 (21 Aug 2018 16:05), Max: 98.3 (21 Aug 2018 09:34)  HR: 65 (21 Aug 2018 16:05) (64 - 78)  BP: 125/64 (21 Aug 2018 16:05) (102/47 - 132/54)  BP(mean): --  RR: 16 (21 Aug 2018 16:05) (12 - 18)  SpO2: 98% (21 Aug 2018 16:05) (95% - 100%)    PHYSICAL EXAM:    GENERAL: NAD, well-groomed, well-developed  HEAD:  Atraumatic, Normocephalic  EYES: EOMI, PERRLA, conjunctiva and sclera clear  ENMT:  Moist mucous membranes  NECK: Supple, No JVD  NERVOUS SYSTEM:  Alert & Oriented X3, Good concentration; Moving all 4 extremities; No gross sensory deficits  CHEST/LUNG: Clear to auscultation bilaterally; No rales, rhonchi, wheezing, or rubs  HEART: Regular rate and rhythm; No murmurs, rubs, or gallops  ABDOMEN: Soft, Nontender, Nondistended; Bowel sounds present  EXTREMITIES:  2+ Peripheral Pulses, No clubbing, cyanosis, or edema  LYMPH: No lymphadenopathy noted  /RECTAL: Not examined  BREAST: Not examined  SKIN: No rashes or lesions  INCISION: dressing intact.     LABS:    CAPILLARY BLOOD GLUCOSE    RADIOLOGY & ADDITIONAL STUDIES:    EKG: sinus rhythm  Personally Reviewed:  [x] YES     Imaging: tkr in place  Personally Reviewed:  [x] YES     Consultant(s) Notes Reviewed:      Care Discussed with Consultants/Other Providers:

## 2018-08-22 LAB
ANION GAP SERPL CALC-SCNC: 13 MMOL/L — SIGNIFICANT CHANGE UP (ref 5–17)
BUN SERPL-MCNC: 22 MG/DL — SIGNIFICANT CHANGE UP (ref 7–23)
CALCIUM SERPL-MCNC: 8.9 MG/DL — SIGNIFICANT CHANGE UP (ref 8.4–10.5)
CHLORIDE SERPL-SCNC: 103 MMOL/L — SIGNIFICANT CHANGE UP (ref 96–108)
CO2 SERPL-SCNC: 23 MMOL/L — SIGNIFICANT CHANGE UP (ref 22–31)
CREAT SERPL-MCNC: 1 MG/DL — SIGNIFICANT CHANGE UP (ref 0.5–1.3)
GLUCOSE SERPL-MCNC: 142 MG/DL — HIGH (ref 70–99)
HCT VFR BLD CALC: 32.8 % — LOW (ref 39–50)
HGB BLD-MCNC: 11.2 G/DL — LOW (ref 13–17)
MCHC RBC-ENTMCNC: 30.6 PG — SIGNIFICANT CHANGE UP (ref 27–34)
MCHC RBC-ENTMCNC: 34.1 GM/DL — SIGNIFICANT CHANGE UP (ref 32–36)
MCV RBC AUTO: 89.6 FL — SIGNIFICANT CHANGE UP (ref 80–100)
NRBC # BLD: 0 /100 WBCS — SIGNIFICANT CHANGE UP (ref 0–0)
PLATELET # BLD AUTO: 175 K/UL — SIGNIFICANT CHANGE UP (ref 150–400)
POTASSIUM SERPL-MCNC: 3.5 MMOL/L — SIGNIFICANT CHANGE UP (ref 3.5–5.3)
POTASSIUM SERPL-SCNC: 3.5 MMOL/L — SIGNIFICANT CHANGE UP (ref 3.5–5.3)
RBC # BLD: 3.66 M/UL — LOW (ref 4.2–5.8)
RBC # FLD: 13.4 % — SIGNIFICANT CHANGE UP (ref 10.3–14.5)
SODIUM SERPL-SCNC: 139 MMOL/L — SIGNIFICANT CHANGE UP (ref 135–145)
WBC # BLD: 14.7 K/UL — HIGH (ref 3.8–10.5)
WBC # FLD AUTO: 14.7 K/UL — HIGH (ref 3.8–10.5)

## 2018-08-22 PROCEDURE — 99233 SBSQ HOSP IP/OBS HIGH 50: CPT

## 2018-08-22 RX ORDER — ATORVASTATIN CALCIUM 80 MG/1
1 TABLET, FILM COATED ORAL
Qty: 0 | Refills: 0 | COMMUNITY

## 2018-08-22 RX ORDER — NIFEDIPINE 30 MG
1 TABLET, EXTENDED RELEASE 24 HR ORAL
Qty: 0 | Refills: 0 | COMMUNITY

## 2018-08-22 RX ORDER — METOPROLOL TARTRATE 50 MG
1 TABLET ORAL
Qty: 0 | Refills: 0 | COMMUNITY

## 2018-08-22 RX ORDER — ASCORBIC ACID 60 MG
1 TABLET,CHEWABLE ORAL
Qty: 0 | Refills: 0 | COMMUNITY

## 2018-08-22 RX ORDER — HYDRALAZINE HCL 50 MG
0 TABLET ORAL
Qty: 0 | Refills: 0 | COMMUNITY

## 2018-08-22 RX ORDER — DOXAZOSIN MESYLATE 4 MG
1 TABLET ORAL
Qty: 0 | Refills: 0 | COMMUNITY

## 2018-08-22 RX ORDER — IRBESARTAN 75 MG/1
300 TABLET ORAL DAILY
Qty: 0 | Refills: 0 | Status: DISCONTINUED | OUTPATIENT
Start: 2018-08-23 | End: 2018-08-23

## 2018-08-22 RX ORDER — IRBESARTAN 75 MG/1
1 TABLET ORAL
Qty: 0 | Refills: 0 | COMMUNITY

## 2018-08-22 RX ADMIN — Medication 50 MILLIGRAM(S): at 05:30

## 2018-08-22 RX ADMIN — Medication 1000 MILLIGRAM(S): at 22:03

## 2018-08-22 RX ADMIN — Medication 50 MILLIGRAM(S): at 18:01

## 2018-08-22 RX ADMIN — Medication 400 MILLIGRAM(S): at 01:36

## 2018-08-22 RX ADMIN — Medication 1000 MILLIGRAM(S): at 14:12

## 2018-08-22 RX ADMIN — Medication 400 MILLIGRAM(S): at 06:48

## 2018-08-22 RX ADMIN — SENNA PLUS 2 TABLET(S): 8.6 TABLET ORAL at 22:02

## 2018-08-22 RX ADMIN — Medication 100 MILLIGRAM(S): at 14:11

## 2018-08-22 RX ADMIN — CELECOXIB 200 MILLIGRAM(S): 200 CAPSULE ORAL at 05:31

## 2018-08-22 RX ADMIN — PANTOPRAZOLE SODIUM 40 MILLIGRAM(S): 20 TABLET, DELAYED RELEASE ORAL at 14:11

## 2018-08-22 RX ADMIN — ATORVASTATIN CALCIUM 40 MILLIGRAM(S): 80 TABLET, FILM COATED ORAL at 22:02

## 2018-08-22 RX ADMIN — Medication 1000 MILLIGRAM(S): at 06:48

## 2018-08-22 RX ADMIN — Medication 81 MILLIGRAM(S): at 09:24

## 2018-08-22 RX ADMIN — Medication 1000 MILLIGRAM(S): at 01:36

## 2018-08-22 RX ADMIN — CELECOXIB 200 MILLIGRAM(S): 200 CAPSULE ORAL at 18:01

## 2018-08-22 RX ADMIN — Medication 1000 MILLIGRAM(S): at 22:02

## 2018-08-22 RX ADMIN — CELECOXIB 200 MILLIGRAM(S): 200 CAPSULE ORAL at 19:00

## 2018-08-22 RX ADMIN — Medication 81 MILLIGRAM(S): at 22:02

## 2018-08-22 RX ADMIN — Medication 100 MILLIGRAM(S): at 04:04

## 2018-08-22 RX ADMIN — Medication 4 MILLIGRAM(S): at 22:02

## 2018-08-22 RX ADMIN — CELECOXIB 200 MILLIGRAM(S): 200 CAPSULE ORAL at 05:30

## 2018-08-22 RX ADMIN — Medication 60 MILLIGRAM(S): at 05:30

## 2018-08-22 RX ADMIN — Medication 100 MILLIGRAM(S): at 05:30

## 2018-08-22 RX ADMIN — Medication 100 MILLIGRAM(S): at 22:02

## 2018-08-22 NOTE — OCCUPATIONAL THERAPY INITIAL EVALUATION ADULT - TOILETING, PREVIOUS LEVEL OF FUNCTION, OT EVAL
----- Message from Yoel Nguyen MD sent at 1/10/2018  9:08 AM CST -----  Her cholesterol is quite high, more then the last 6 years.  Would she consider trying Red yeast rice or a statin again.  
Pt was advised and verbalized understanding.   
Nguyen to advise.   
She could do 600 mg twice a day  
independent

## 2018-08-22 NOTE — OCCUPATIONAL THERAPY INITIAL EVALUATION ADULT - TRANSFER TRAINING, PT EVAL
Patient will transfer to toilet and stall shower with DME as needed with supervision in 2-4 sessions.

## 2018-08-22 NOTE — OCCUPATIONAL THERAPY INITIAL EVALUATION ADULT - ADDITIONAL COMMENTS
Pt lives in  a private house [address] + railing and 15 inside + railing + stall shower with built in seat and grab bar. Pt has a grab bar by toilet. Pt owns a rolling walker and cane. Pt lives in  a private house [address] + railing and 15 steps + railing to access bedroom and stall shower.  + stall shower with built in seat and grab bar. Pt has a grab bar by toilet. Pt owns a rolling walker, shower chair with back and cane. Pt declined to order a commode. Pt wears glasses.

## 2018-08-22 NOTE — PROGRESS NOTE ADULT - SUBJECTIVE AND OBJECTIVE BOX
Patient is a 73y old  Male who presents with a chief complaint of Left Knee Pain (21 Aug 2018 19:06)      INTERVAL HPI/OVERNIGHT EVENTS: feeling ok, pain controlled. +flatus no BM.  using incentive spirometer.     MEDICATIONS  (STANDING):  acetaminophen   Tablet. 1000 milliGRAM(s) Oral every 8 hours  aspirin enteric coated 81 milliGRAM(s) Oral every 12 hours  atorvastatin 40 milliGRAM(s) Oral at bedtime  celecoxib 200 milliGRAM(s) Oral two times a day  docusate sodium 100 milliGRAM(s) Oral three times a day  doxazosin 4 milliGRAM(s) Oral at bedtime  hydrALAZINE 50 milliGRAM(s) Oral two times a day  lactated ringers. 1000 milliLiter(s) (100 mL/Hr) IV Continuous <Continuous>  metoprolol succinate ER 50 milliGRAM(s) Oral daily  NIFEdipine XL 60 milliGRAM(s) Oral daily  pantoprazole    Tablet 40 milliGRAM(s) Oral daily  senna 2 Tablet(s) Oral at bedtime    MEDICATIONS  (PRN):  aluminum hydroxide/magnesium hydroxide/simethicone Suspension 30 milliLiter(s) Oral four times a day PRN Indigestion  HYDROmorphone  Injectable 0.5 milliGRAM(s) IV Push every 3 hours PRN Severe Pain (7 - 10)  magnesium hydroxide Suspension 30 milliLiter(s) Oral daily PRN Constipation  oxyCODONE    IR 5 milliGRAM(s) Oral every 3 hours PRN Mild Pain (1 - 3)  oxyCODONE    IR 10 milliGRAM(s) Oral every 3 hours PRN Moderate Pain (4 - 6)  polyethylene glycol 3350 17 Gram(s) Oral daily PRN Constipation      Allergies  No Known Allergies    REVIEW OF SYSTEMS:  CONSTITUTIONAL: No fever, weight loss, or fatigue  EYES: No eye pain, visual disturbances, or discharge  ENMT:  No difficulty hearing, tinnitus, vertigo; No sinus or throat pain  NECK: No pain or stiffness  BREASTS: No pain, masses, or nipple discharge  RESPIRATORY: No cough, wheezing, chills or hemoptysis; No shortness of breath  CARDIOVASCULAR: No chest pain, palpitations, or lightheadedness  GASTROINTESTINAL: No abdominal or epigastric pain. No nausea, vomiting, or hematemesis; No diarrhea or constipation. No melena or hematochezia.  GENITOURINARY: No dysuria, frequency, hematuria, or incontinence  NEUROLOGICAL: No headaches, vertigo, memory loss, loss of strength, numbness, or tremors  SKIN: No itching, burning, rashes, or lesions   LYMPH NODES: No enlarged glands  ENDOCRINE: No heat or cold intolerance; No hair loss; No polydipsia or polyuria  MUSCULOSKELETAL: No back pain  PSYCHIATRIC: No depression, anxiety, or mood swings  HEME/LYMPH: No easy bruising, or bleeding gums  ALLERGY AND IMMUNOLOGIC: No hives or eczema    Vital Signs Last 24 Hrs  T(C): 36.5 (22 Aug 2018 11:46), Max: 36.9 (22 Aug 2018 07:08)  T(F): 97.7 (22 Aug 2018 11:46), Max: 98.4 (22 Aug 2018 07:08)  HR: 75 (22 Aug 2018 11:46) (65 - 91)  BP: 116/65 (22 Aug 2018 11:46) (108/54 - 152/76)  BP(mean): --  RR: 18 (22 Aug 2018 11:46) (12 - 18)  SpO2: 98% (22 Aug 2018 11:46) (95% - 98%)    PHYSICAL EXAM:  GENERAL: NAD, well-groomed, well-developed  HEAD:  Atraumatic, Normocephalic  EYES: EOMI, PERRLA, conjunctiva and sclera clear  ENMT: Moist mucous membranes  NECK: Supple, No JVD,  NERVOUS SYSTEM:  Alert & Oriented X3, Good concentration; Bilateral LE mobile, sensation to light touch intact  CHEST/LUNG: Clear to auscultation bilaterally; No rales, rhonchi, wheezing, or rubs  HEART: Regular rate and rhythm; No murmurs, rubs, or gallops  ABDOMEN: Soft, Nontender, Nondistended; Bowel sounds present  EXTREMITIES:  2+ Peripheral Pulses, No clubbing or cyanosis  LYMPH: No lymphadenopathy noted  SKIN: No rashes or lesions  INCISION:  Dressing dry and intact    LABS:                        11.2   14.70 )-----------( 175      ( 22 Aug 2018 07:19 )             32.8     22 Aug 2018 07:19    139    |  103    |  22     ----------------------------<  142    3.5     |  23     |  1.00     Ca    8.9        22 Aug 2018 07:19          CAPILLARY BLOOD GLUCOSE          RADIOLOGY & ADDITIONAL TESTS:    Imaging Personally Reviewed:      [ ] Consultant(s) Notes Reviewed  [x] Care Discussed with Consultants/Other Providers:  Ortho PA- plan of care

## 2018-08-22 NOTE — PROGRESS NOTE ADULT - SUBJECTIVE AND OBJECTIVE BOX
Discharge medication calendar:  (ASA 81mg Qday preop)  Aspirin EC 81mg q12h x 6 weeks then resume ASA 81mg Qday  APAP 1000mg q8h x 2-3 weeks  Celecoxib 200mg q12h x 2-3 weeks  Pantoprazole 40mg QAM x 6 weeks  Narcotic PRN  Docusate 100mg TID while taking narcotic  Miralax, Senna, or Bisacodyl PRN for treatment of constipation

## 2018-08-22 NOTE — PROGRESS NOTE ADULT - PROBLEM SELECTOR PLAN 1
Pain Management: acceptable- continue current care Tylenol ATC/Celebrex ATC/ Oxycodone PRN  Continue PT/OT  DVT proph: [x] low risk - Aspirin    DC plan:  [x] Home with HC.

## 2018-08-22 NOTE — PROGRESS NOTE ADULT - SUBJECTIVE AND OBJECTIVE BOX
ORTHOPEDIC ATTENDING PROGRESS NOTE  VAL GOODMAN      73y Male                                                                                                                               POD #   1     STATUS POST:               Pre-Op Dx: Primary localized osteoarthritis of left knee    Post-Op Dx:  Primary localized osteoarthritis of left knee    Procedure: Left total knee arthroplasty                                                Pain (0-10):  Pt reports  Current Pain Management:  [ ] PCA   [x ] Po Analgesics [ ] IM /IV Anagesics     T(F): 98  HR: 83  BP: 125/71  RR: 16  SpO2: 96%                         11.2   14.70 )-----------( 175      ( 22 Aug 2018 07:19 )             32.8         08-22    139  |  103  |  22  ----------------------------<  142<H>  3.5   |  23  |  1.00    Ca    8.9      22 Aug 2018 07:19      Physical Exam :    -   Dressing changed sterile.   -   Wound C/D/I.   -   Distal Neurvascular status intact grossly.   -   Warm well perfused; capillary refill <3 seconds   -   (+)EHL/FHL   -   (+) Sensation to light touch  -   (-) Calf tenderness Bilaterally    A/P: 73y Male s/p Left total knee arthroplasty     -   Ortho Stable  -   Pain control   -   Medicine to follow  -   DVT ppx:     [ ]SCDs     [x ] ASA     [ ] Eliquis     [ ] Lovenox  -   Weight bearing status:  WBAT [x ]        PWB    [ ]     TTWB  [ ]      NWB  [ ]   -  Dispo:     Home [x ]     Acute Rehab [ ]     SANDI [ ]     TBD [ ]

## 2018-08-22 NOTE — OCCUPATIONAL THERAPY INITIAL EVALUATION ADULT - GENERAL OBSERVATIONS, REHAB EVAL
Pt found in bed  IV,  PAS, supplemental 02 , +hemovac left knee Pt found supine in bed  +IV and +hemovac/bandage left knee.

## 2018-08-23 VITALS
HEART RATE: 74 BPM | SYSTOLIC BLOOD PRESSURE: 114 MMHG | OXYGEN SATURATION: 98 % | RESPIRATION RATE: 16 BRPM | TEMPERATURE: 98 F | DIASTOLIC BLOOD PRESSURE: 69 MMHG

## 2018-08-23 LAB
ANION GAP SERPL CALC-SCNC: 10 MMOL/L — SIGNIFICANT CHANGE UP (ref 5–17)
BUN SERPL-MCNC: 24 MG/DL — HIGH (ref 7–23)
CALCIUM SERPL-MCNC: 8.5 MG/DL — SIGNIFICANT CHANGE UP (ref 8.4–10.5)
CHLORIDE SERPL-SCNC: 109 MMOL/L — HIGH (ref 96–108)
CO2 SERPL-SCNC: 25 MMOL/L — SIGNIFICANT CHANGE UP (ref 22–31)
CREAT SERPL-MCNC: 0.92 MG/DL — SIGNIFICANT CHANGE UP (ref 0.5–1.3)
GLUCOSE SERPL-MCNC: 105 MG/DL — HIGH (ref 70–99)
HCT VFR BLD CALC: 30.7 % — LOW (ref 39–50)
HGB BLD-MCNC: 10.5 G/DL — LOW (ref 13–17)
MCHC RBC-ENTMCNC: 31 PG — SIGNIFICANT CHANGE UP (ref 27–34)
MCHC RBC-ENTMCNC: 34.2 GM/DL — SIGNIFICANT CHANGE UP (ref 32–36)
MCV RBC AUTO: 90.6 FL — SIGNIFICANT CHANGE UP (ref 80–100)
NRBC # BLD: 0 /100 WBCS — SIGNIFICANT CHANGE UP (ref 0–0)
PLATELET # BLD AUTO: 159 K/UL — SIGNIFICANT CHANGE UP (ref 150–400)
POTASSIUM SERPL-MCNC: 3.6 MMOL/L — SIGNIFICANT CHANGE UP (ref 3.5–5.3)
POTASSIUM SERPL-SCNC: 3.6 MMOL/L — SIGNIFICANT CHANGE UP (ref 3.5–5.3)
RBC # BLD: 3.39 M/UL — LOW (ref 4.2–5.8)
RBC # FLD: 13.8 % — SIGNIFICANT CHANGE UP (ref 10.3–14.5)
SODIUM SERPL-SCNC: 144 MMOL/L — SIGNIFICANT CHANGE UP (ref 135–145)
WBC # BLD: 8.16 K/UL — SIGNIFICANT CHANGE UP (ref 3.8–10.5)
WBC # FLD AUTO: 8.16 K/UL — SIGNIFICANT CHANGE UP (ref 3.8–10.5)

## 2018-08-23 PROCEDURE — 80048 BASIC METABOLIC PNL TOTAL CA: CPT

## 2018-08-23 PROCEDURE — 97110 THERAPEUTIC EXERCISES: CPT

## 2018-08-23 PROCEDURE — 88305 TISSUE EXAM BY PATHOLOGIST: CPT

## 2018-08-23 PROCEDURE — 97535 SELF CARE MNGMENT TRAINING: CPT

## 2018-08-23 PROCEDURE — C1776: CPT

## 2018-08-23 PROCEDURE — 97530 THERAPEUTIC ACTIVITIES: CPT

## 2018-08-23 PROCEDURE — 85018 HEMOGLOBIN: CPT

## 2018-08-23 PROCEDURE — 85014 HEMATOCRIT: CPT

## 2018-08-23 PROCEDURE — 36415 COLL VENOUS BLD VENIPUNCTURE: CPT

## 2018-08-23 PROCEDURE — 99232 SBSQ HOSP IP/OBS MODERATE 35: CPT

## 2018-08-23 PROCEDURE — 85027 COMPLETE CBC AUTOMATED: CPT

## 2018-08-23 PROCEDURE — C1889: CPT

## 2018-08-23 PROCEDURE — 88311 DECALCIFY TISSUE: CPT

## 2018-08-23 PROCEDURE — 97116 GAIT TRAINING THERAPY: CPT

## 2018-08-23 PROCEDURE — 73562 X-RAY EXAM OF KNEE 3: CPT

## 2018-08-23 PROCEDURE — 94664 DEMO&/EVAL PT USE INHALER: CPT

## 2018-08-23 PROCEDURE — 97165 OT EVAL LOW COMPLEX 30 MIN: CPT

## 2018-08-23 PROCEDURE — 97161 PT EVAL LOW COMPLEX 20 MIN: CPT

## 2018-08-23 PROCEDURE — C1713: CPT

## 2018-08-23 RX ORDER — OXYCODONE HYDROCHLORIDE 5 MG/1
1 TABLET ORAL
Qty: 60 | Refills: 0 | OUTPATIENT
Start: 2018-08-23

## 2018-08-23 RX ADMIN — PANTOPRAZOLE SODIUM 40 MILLIGRAM(S): 20 TABLET, DELAYED RELEASE ORAL at 11:49

## 2018-08-23 RX ADMIN — Medication 60 MILLIGRAM(S): at 05:25

## 2018-08-23 RX ADMIN — Medication 1000 MILLIGRAM(S): at 05:26

## 2018-08-23 RX ADMIN — Medication 81 MILLIGRAM(S): at 09:32

## 2018-08-23 RX ADMIN — CELECOXIB 200 MILLIGRAM(S): 200 CAPSULE ORAL at 05:26

## 2018-08-23 RX ADMIN — Medication 1000 MILLIGRAM(S): at 05:58

## 2018-08-23 RX ADMIN — IRBESARTAN 300 MILLIGRAM(S): 75 TABLET ORAL at 05:26

## 2018-08-23 RX ADMIN — Medication 1000 MILLIGRAM(S): at 11:49

## 2018-08-23 RX ADMIN — Medication 1000 MILLIGRAM(S): at 11:50

## 2018-08-23 RX ADMIN — Medication 50 MILLIGRAM(S): at 05:28

## 2018-08-23 RX ADMIN — Medication 50 MILLIGRAM(S): at 05:26

## 2018-08-23 RX ADMIN — Medication 100 MILLIGRAM(S): at 05:25

## 2018-08-23 RX ADMIN — Medication 100 MILLIGRAM(S): at 11:49

## 2018-08-23 RX ADMIN — CELECOXIB 200 MILLIGRAM(S): 200 CAPSULE ORAL at 05:25

## 2018-08-23 NOTE — PROGRESS NOTE ADULT - PROBLEM SELECTOR PLAN 1
Pain Management: acceptable- continue current care Tylenol ATC/Celebrex ATC/ Oxycodone PRN  Continue PT/OT  DVT proph: [x] low risk - Aspirin    DC plan:  [x] Home with HC - today

## 2018-08-23 NOTE — PROGRESS NOTE ADULT - SUBJECTIVE AND OBJECTIVE BOX
Patient is a 73y old  Male who presents with a chief complaint of Left Knee Pain (21 Aug 2018 19:06)      INTERVAL HPI/OVERNIGHT EVENTS: feeling well, pain about 3/10;   +BM yesterday, still passing gas.     MEDICATIONS  (STANDING):  acetaminophen   Tablet. 1000 milliGRAM(s) Oral every 8 hours  aspirin enteric coated 81 milliGRAM(s) Oral every 12 hours  atorvastatin 40 milliGRAM(s) Oral at bedtime  celecoxib 200 milliGRAM(s) Oral two times a day  docusate sodium 100 milliGRAM(s) Oral three times a day  doxazosin 4 milliGRAM(s) Oral at bedtime  hydrALAZINE 50 milliGRAM(s) Oral two times a day  irbesartan 300 milliGRAM(s) Oral daily  lactated ringers. 1000 milliLiter(s) (100 mL/Hr) IV Continuous <Continuous>  metoprolol succinate ER 50 milliGRAM(s) Oral daily  NIFEdipine XL 60 milliGRAM(s) Oral daily  pantoprazole    Tablet 40 milliGRAM(s) Oral daily  senna 2 Tablet(s) Oral at bedtime    MEDICATIONS  (PRN):  aluminum hydroxide/magnesium hydroxide/simethicone Suspension 30 milliLiter(s) Oral four times a day PRN Indigestion  bisacodyl Suppository 10 milliGRAM(s) Rectal daily PRN If no bowel movement by postoperative day #2  HYDROmorphone  Injectable 0.5 milliGRAM(s) IV Push every 3 hours PRN Severe Pain (7 - 10)  magnesium hydroxide Suspension 30 milliLiter(s) Oral daily PRN Constipation  oxyCODONE    IR 5 milliGRAM(s) Oral every 3 hours PRN Mild Pain (1 - 3)  oxyCODONE    IR 10 milliGRAM(s) Oral every 3 hours PRN Moderate Pain (4 - 6)  polyethylene glycol 3350 17 Gram(s) Oral daily PRN Constipation      Allergies  No Known Allergies    REVIEW OF SYSTEMS:  CONSTITUTIONAL: No fever, weight loss, or fatigue  EYES: No eye pain, visual disturbances, or discharge  ENMT:  No difficulty hearing, tinnitus, vertigo; No sinus or throat pain  NECK: No pain or stiffness  BREASTS: No pain, masses, or nipple discharge  RESPIRATORY: No cough, wheezing, chills or hemoptysis; No shortness of breath  CARDIOVASCULAR: No chest pain, palpitations, or lightheadedness  GASTROINTESTINAL: No abdominal or epigastric pain. No nausea, vomiting, or hematemesis; No diarrhea or constipation. No melena or hematochezia.  GENITOURINARY: No dysuria, frequency, hematuria, or incontinence  NEUROLOGICAL: No headaches, vertigo, memory loss, loss of strength, numbness, or tremors  SKIN: No itching, burning, rashes, or lesions   LYMPH NODES: No enlarged glands  ENDOCRINE: No heat or cold intolerance; No hair loss; No polydipsia or polyuria  MUSCULOSKELETAL: No back pain  PSYCHIATRIC: No depression, anxiety, or mood swings  HEME/LYMPH: No easy bruising, or bleeding gums  ALLERGY AND IMMUNOLOGIC: No hives or eczema    Vital Signs Last 24 Hrs  T(C): 36.4 (23 Aug 2018 11:47), Max: 36.8 (22 Aug 2018 19:35)  T(F): 97.6 (23 Aug 2018 11:47), Max: 98.3 (22 Aug 2018 19:35)  HR: 74 (23 Aug 2018 11:47) (68 - 80)  BP: 114/69 (23 Aug 2018 11:47) (114/69 - 153/66)  BP(mean): --  RR: 16 (23 Aug 2018 11:47) (16 - 18)  SpO2: 98% (23 Aug 2018 11:47) (94% - 98%)    PHYSICAL EXAM:  GENERAL: NAD, well-groomed, well-developed  HEAD:  Atraumatic, Normocephalic  EYES: EOMI, PERRLA, conjunctiva and sclera clear  ENMT: Moist mucous membranes   NECK: Supple, No JVD, Normal thyroid  NERVOUS SYSTEM:  Alert & Oriented X3, Good concentration; Bilateral LE mobile, sensation to light touch intact  CHEST/LUNG: Clear to auscultation bilaterally; No rales, rhonchi, wheezing, or rubs  HEART: Regular rate and rhythm; No murmurs, rubs, or gallops  ABDOMEN: Soft, Nontender, Nondistended; Bowel sounds present  EXTREMITIES:  2+ Peripheral Pulses, No clubbing or cyanosis  LYMPH: No lymphadenopathy noted  SKIN: No rashes or lesions  INCISION:  Dressing dry and intact    LABS:                        10.5   8.16  )-----------( 159      ( 23 Aug 2018 07:06 )             30.7     23 Aug 2018 07:06    144    |  109    |  24     ----------------------------<  105    3.6     |  25     |  0.92     Ca    8.5        23 Aug 2018 07:06          CAPILLARY BLOOD GLUCOSE          RADIOLOGY & ADDITIONAL TESTS:    Imaging Personally Reviewed:      [ ] Consultant(s) Notes Reviewed  [x] Care Discussed with Consultants/Other Providers:  Ortho PA- plan of care

## 2018-08-23 NOTE — PROGRESS NOTE ADULT - SUBJECTIVE AND OBJECTIVE BOX
VAL PUGHNHORN                                                                [id number]                                                     Allergies---No Known Allergies        Pt is a 73y year old Male s/p left TKR.   Pt. is A&O x 3, resting comfortably, with no complaints.   Pain is 2/10.   Tolerating the diet.   Denies chest pain / shortness of breath / dyspnea / nausea / vomiting / headaches or light headed ness.         Vital Signs Last 24 Hrs  T(F): 98.1 (08-23-18 @ 07:11), Max: 98.3 (08-22-18 @ 23:19)  HR: 68 (08-23-18 @ 07:11)  BP: 129/71 (08-23-18 @ 07:11)  RR: 16 (08-23-18 @ 07:11)  SpO2: 98% (08-23-18 @ 07:11)    I&O's Detail    22 Aug 2018 07:01  -  23 Aug 2018 07:00  --------------------------------------------------------  IN:  Total IN: 0 mL    OUT:    Accordian: 110 mL    Voided: 500 mL  Total OUT: 610 mL    Total NET: -610 mL        PE:   Left Lower Extremity:   Dressing is C/D/I.   Dressing changed.   Incision is clean and dry.   The wound is closed with prineo.   No redness, swelling, heat, discharge or other evidence of infection, superficial or deep.   Neurovascularly intact.   No gross evidence of motor or sensory deficit.   +2 DP/PT pulses.   EHL/FHL/TA intact.   Toes are pink and mobile.   Capillary refill < 2 seconds.   Negative calf tenderness.   PAS on.   CPM: 0-65                             10.5   8.16  )--------------( 159                          08-23-18 @ 07:06               30.7         144   |  109  |  24  -----------------------------<  105                  08-23-18 @ 07:06  3.6    |  25    |  0.92        Ca    8.5                A:   Pt is a 73y year old Male S/P left total knee replacement, Post Op Day #2        Plan:    - Follow up with Medicine    - OOB with PT/OT   - continue Use CPM 2 hours 3 times a day. Start daily @ 6AM.  Settings: Extension = 0; Flexion now at xx deg. ; advance 5-10deg. daily to goal 120 deg.  Consider Pain meds prior to CPM; Apply ice to knee Post CPM   - Pain control    - Incentive spirometry   - Labs in A.M.   - Discharge Planning   - DVT ppx = PAS +  aspirin enteric coated 81 milliGRAM(s) Oral every 12 hours                                                                                                                                                                             Edilson GONZALEZ

## 2018-08-23 NOTE — PROGRESS NOTE ADULT - SUBJECTIVE AND OBJECTIVE BOX
ORTHOPEDIC ATTENDING PROGRESS NOTE  VAL GOODMAN      73y Male                                                                                                                               POD #  2      STATUS POST:               Pre-Op Dx: Primary localized osteoarthritis of left knee    Post-Op Dx:  Primary localized osteoarthritis of left knee    Procedure: Left total knee arthroplasty                                                Pain (0-10):  Pt reports  Current Pain Management:  [ ] PCA   [ x] Po Analgesics [ ] IM /IV Anagesics     T(F): 97.9  HR: 71  BP: 138/71  RR: 16  SpO2: 96%                         10.5   8.16  )-----------( 159      ( 23 Aug 2018 07:06 )             30.7         08-23    144  |  109<H>  |  24<H>  ----------------------------<  105<H>  3.6   |  25  |  0.92    Ca    8.5      23 Aug 2018 07:06      Physical Exam :    -   Dressing changed sterile.   -   Wound C/D/I.   -   Distal Neurvascular status intact grossly.   -   Warm well perfused; capillary refill <3 seconds   -   (+)EHL/FHL   -   (+) Sensation to light touch  -   (-) Calf tenderness Bilaterally    A/P: 73y Male s/p Left total knee arthroplasty     -   Ortho Stable  -   Pain control   -   Medicine to follow  -   DVT ppx:     [ ]SCDs     [x ] ASA     [ ] Eliquis     [ ] Lovenox  -   Weight bearing status:  WBAT [x ]        PWB    [ ]     TTWB  [ ]      NWB  [ ]   -  Dispo:     Home [x ]     Acute Rehab [ ]     SANDI [ ]     TBD [ ]

## 2018-09-06 ENCOUNTER — APPOINTMENT (OUTPATIENT)
Dept: ORTHOPEDIC SURGERY | Facility: CLINIC | Age: 73
End: 2018-09-06
Payer: MEDICARE

## 2018-09-06 VITALS — HEIGHT: 70 IN | BODY MASS INDEX: 28.2 KG/M2 | WEIGHT: 197 LBS

## 2018-09-06 PROBLEM — M17.12 UNILATERAL PRIMARY OSTEOARTHRITIS, LEFT KNEE: Chronic | Status: ACTIVE | Noted: 2018-08-09

## 2018-09-06 PROCEDURE — 99024 POSTOP FOLLOW-UP VISIT: CPT

## 2018-09-06 PROCEDURE — 73560 X-RAY EXAM OF KNEE 1 OR 2: CPT | Mod: 50

## 2018-10-03 ENCOUNTER — APPOINTMENT (OUTPATIENT)
Dept: ORTHOPEDIC SURGERY | Facility: CLINIC | Age: 73
End: 2018-10-03
Payer: MEDICARE

## 2018-10-03 VITALS — WEIGHT: 195 LBS | HEIGHT: 70 IN | BODY MASS INDEX: 27.92 KG/M2

## 2018-10-03 PROCEDURE — 99213 OFFICE O/P EST LOW 20 MIN: CPT | Mod: 24

## 2018-10-03 PROCEDURE — 73560 X-RAY EXAM OF KNEE 1 OR 2: CPT | Mod: LT

## 2018-11-21 ENCOUNTER — APPOINTMENT (OUTPATIENT)
Dept: ORTHOPEDIC SURGERY | Facility: CLINIC | Age: 73
End: 2018-11-21
Payer: MEDICARE

## 2018-11-21 VITALS — WEIGHT: 195 LBS | HEIGHT: 70 IN | BODY MASS INDEX: 27.92 KG/M2

## 2018-11-21 PROCEDURE — 99213 OFFICE O/P EST LOW 20 MIN: CPT

## 2018-11-21 PROCEDURE — 73562 X-RAY EXAM OF KNEE 3: CPT | Mod: LT

## 2019-04-15 ENCOUNTER — APPOINTMENT (OUTPATIENT)
Dept: ORTHOPEDIC SURGERY | Facility: CLINIC | Age: 74
End: 2019-04-15
Payer: MEDICARE

## 2019-04-15 VITALS — HEIGHT: 70 IN | BODY MASS INDEX: 27.92 KG/M2 | WEIGHT: 195 LBS

## 2019-04-15 PROCEDURE — 99213 OFFICE O/P EST LOW 20 MIN: CPT

## 2019-04-15 PROCEDURE — 73562 X-RAY EXAM OF KNEE 3: CPT | Mod: LT

## 2019-07-22 ENCOUNTER — APPOINTMENT (OUTPATIENT)
Dept: ORTHOPEDIC SURGERY | Facility: CLINIC | Age: 74
End: 2019-07-22
Payer: MEDICARE

## 2019-07-22 VITALS — HEIGHT: 70 IN | BODY MASS INDEX: 27.92 KG/M2 | WEIGHT: 195 LBS

## 2019-07-22 PROCEDURE — 99213 OFFICE O/P EST LOW 20 MIN: CPT

## 2019-07-22 PROCEDURE — 73562 X-RAY EXAM OF KNEE 3: CPT | Mod: 50

## 2019-07-29 ENCOUNTER — RX RENEWAL (OUTPATIENT)
Age: 74
End: 2019-07-29

## 2019-07-29 RX ORDER — CEPHALEXIN 500 MG/1
500 CAPSULE ORAL
Qty: 20 | Refills: 0 | Status: ACTIVE | COMMUNITY
Start: 2019-04-15 | End: 1900-01-01

## 2020-08-17 ENCOUNTER — APPOINTMENT (OUTPATIENT)
Dept: ORTHOPEDIC SURGERY | Facility: CLINIC | Age: 75
End: 2020-08-17
Payer: MEDICARE

## 2020-08-17 VITALS — BODY MASS INDEX: 24.92 KG/M2 | HEIGHT: 71 IN | WEIGHT: 178 LBS

## 2020-08-17 PROCEDURE — 99213 OFFICE O/P EST LOW 20 MIN: CPT

## 2020-08-17 PROCEDURE — 73562 X-RAY EXAM OF KNEE 3: CPT | Mod: 50

## 2021-01-05 NOTE — PRE-OP CHECKLIST - TEMPERATURE IN CELSIUS (DEGREES C)
[FreeTextEntry1] : GENERAL PHYSICAL EXAM:\par GEN: no distress, normal affect\par HEENT: NCAT, OP clear\par EYES: sclera white, conjunctiva clear, no nystagmus\par NECK: supple\par CV: RRR    		\par PULM: CTAB, no wheezing\par GI: soft ABD, +BS, NT, ND\par EXT: peripheral pulse intact, no cyanosis\par MSK: muscle tone and strength normal\par SKIN: warm, dry, no rash or lesion on exposed skin \par \par NEUROLOGICAL EXAM:\par Mental Status\par Orientation: alert and oriented to person, place, time, and situation \par Language: clear and fluent, intact comprehension and repetition, intact naming and reading\par \par Cranial Nerves\par II: full visual fields intact \par III, IV, VI: PERRL, EOMI\par V, VII: facial sensation and movement intact and symmetric \par VIII: hearing intact \par IX, X: uvula midline, soft palate elevates normally \par XI: BL shoulder shrug intact \par XII: tongue midline\par \par Motor\par Shoulder abd: 5 (R), 5 (L)\par EF/EE: 5 (R), 5 (L)\par WF/WE: 5 (R), 5 (L)\par hand : 5 (R), 5 (L)\par HF/HE: 5 (R), 5 (L)\par KF/KE: 5 (R), 5 (L)\par DF/PF: 5 (R), 5 (L)                \par Tone and bulk are normal in upper and lower limbs\par No pronator drift\par \par Sensation\par Intact to light touch and pinprick in all 4 EXTs\par \par Reflex\par 2+ in BL biceps, triceps, brachioradialis, patella, ankle                                    \par Plantar responses downward bilaterally\par \par Coordination\par Normal FTN bilaterally\par \par Gait\par Normal stance, stride, and pivot turn\par Tandem walk intact\par Negative Romberg\par  
36.8

## 2021-06-14 ENCOUNTER — APPOINTMENT (OUTPATIENT)
Dept: ORTHOPEDIC SURGERY | Facility: CLINIC | Age: 76
End: 2021-06-14
Payer: MEDICARE

## 2021-06-14 PROCEDURE — 73030 X-RAY EXAM OF SHOULDER: CPT | Mod: RT

## 2021-06-14 PROCEDURE — 99213 OFFICE O/P EST LOW 20 MIN: CPT

## 2021-06-14 NOTE — PHYSICAL EXAM
[de-identified] : Constitutional\par o Appearance : well-nourished, well developed, alert, in no acute distress \par Head and Face\par o Head :\par ¦ Inspection : atraumatic, normocephalic\par o Face :\par ¦ Inspection : no visible rash or discoloration\par Respiratory\par o Respiratory Effort: breathing unlabored \par Neurologic\par o Sensation : Normal sensation \par Psychiatric\par o Mood and Affect: mood normal, affect appropriate \par Lymphatic\par o Additional Nodes : No palpable lymph nodes present \par \par Cervical Spine\par o Inspection/Palpation :\par ¦ Inspection : alignment midline, normal degree of lordosis present\par ¦ Skin : normal appearance, no masses or tenderness, trachea midline\par ¦ Palpation : musculature is nontender to palpation\par o Range of Motion : arc of motion full in all planes, no crepitance or pain with ROM\par o Tests: Negative Spurling’s test \par \par Right Upper Extremity\par o Right Shoulder :\par ¦ Inspection/Palpation : anterior capsular tenderness, no swelling, ecchymosis about the biceps\par ¦ Range of Motion : pain with full forward flexion, limited internal rotation that is painful, full external rotation without pain, no crepitance\par ¦ Strength :  forward elevation 5/5, supraspinatus 4-/5, external rotation at 90° of abduction 4+/5, internal rotation 4+/5, external rotation 4+/5, adduction and abduction 4+/5, biceps/triceps 5/5\par ¦ Stability : no joint instability on provocative testing \par ¦ Tests: Hernandez negative, Neer test positive, Chente test negative, drop arm test negative, Medway's test negative\par \par Left Upper Extremity\par o Left Shoulder :\par ¦ Inspection/Palpation : no tenderness, no swelling or deformities\par ¦ Range of Motion : full and painless in all planes, no crepitance\par ¦ Strength :  forward elevation, internal rotation 5/5, external rotation 5/5, external rotation at 90 degrees of abduction, supraspinatus, adduction and abduction, biceps/triceps, 5/5\par ¦ Stability : no joint instability on provocative testing\par ¦ Tests: Hernandez negative, Neer test negative, Chente test negative, drop arm test negative\par \par Gait and Station:\par Gait: gait normal, no significant extremity swelling or lymphedema, good proprioception and balance\par \par Radiology Results \par o Right Shoulder : AP internal/external rotation and outlet views were obtained and reveal a slight downsloping acromion with minimal degenerative arthritis and a subacromial spur.

## 2021-06-14 NOTE — HISTORY OF PRESENT ILLNESS
[de-identified] : 76 year old male presents complaining of right shoulder pain that started about 3 months ago when he was playing pickleball. He denies a specific incident where he felt a popping or snapping. He notes painful and restricted motion, especially with forward flexion and internal rotation. He saw an Orthopedist in [place] about 2 months ago who gave him what sounds like a subacromial cortisone injection. He does not think this injection helped very much. He has not played pickleball since this incident. He is also complaining of pain down the lateral aspect of his right leg when he sits for more than 30 minutes.

## 2021-06-14 NOTE — DISCUSSION/SUMMARY
[de-identified] : I discussed the underlying pathophysiology of the patient's condition in great detail with the patient. I went over the patient's x-rays with them in great detail. We discussed the use of ice, Tylenol and anti-inflammatories to relieve pain. The patient was instructed in ROM exercises they are to do at home. He should avoid any heavy lifting, carrying, or overhead activities. He should not lift anything higher than 90° past his shoulder level. At this time, I would like to obtain an MRI of the patient's right shoulder to r/o rotator cuff tear. A prescription for the MRI was provided. We will evaluate his right hip and leg when he returns to review the MRI. All of his questions were answered. He understands and consents to the plan. This treatment plan was discussed and reviewed with the patient's spouse who agrees with the treatment course. \par \par FU after a right shoulder MRI.

## 2021-06-14 NOTE — ADDENDUM
[FreeTextEntry1] : I, Neal Wheat, acted solely as a scribe for Dr. Lalito Rojas on this date 06/14/2021.\par All medical record entries made by the Scribe were at my, Dr. Lalito Rojas, direction and personally dictated by me on 06/14/2021. I have reviewed the chart and agree that the record accurately reflects my personal performance of the history, physical exam, assessment and plan. I have also personally directed, reviewed, and agreed with the chart.

## 2021-06-17 ENCOUNTER — APPOINTMENT (OUTPATIENT)
Dept: MRI IMAGING | Facility: CLINIC | Age: 76
End: 2021-06-17
Payer: MEDICARE

## 2021-06-17 ENCOUNTER — OUTPATIENT (OUTPATIENT)
Dept: OUTPATIENT SERVICES | Facility: HOSPITAL | Age: 76
LOS: 1 days | End: 2021-06-17
Payer: MEDICARE

## 2021-06-17 DIAGNOSIS — Z90.89 ACQUIRED ABSENCE OF OTHER ORGANS: Chronic | ICD-10-CM

## 2021-06-17 DIAGNOSIS — Z96.651 PRESENCE OF RIGHT ARTIFICIAL KNEE JOINT: Chronic | ICD-10-CM

## 2021-06-17 DIAGNOSIS — Z98.890 OTHER SPECIFIED POSTPROCEDURAL STATES: Chronic | ICD-10-CM

## 2021-06-17 DIAGNOSIS — M19.011 PRIMARY OSTEOARTHRITIS, RIGHT SHOULDER: ICD-10-CM

## 2021-06-17 PROCEDURE — 73221 MRI JOINT UPR EXTREM W/O DYE: CPT | Mod: 26,RT,MH

## 2021-06-17 PROCEDURE — 73221 MRI JOINT UPR EXTREM W/O DYE: CPT

## 2021-06-21 ENCOUNTER — NON-APPOINTMENT (OUTPATIENT)
Age: 76
End: 2021-06-21

## 2021-06-30 ENCOUNTER — APPOINTMENT (OUTPATIENT)
Dept: ORTHOPEDIC SURGERY | Facility: CLINIC | Age: 76
End: 2021-06-30
Payer: MEDICARE

## 2021-06-30 DIAGNOSIS — M75.21 BICIPITAL TENDINITIS, RIGHT SHOULDER: ICD-10-CM

## 2021-06-30 PROCEDURE — 99214 OFFICE O/P EST MOD 30 MIN: CPT

## 2021-06-30 NOTE — PHYSICAL EXAM
[de-identified] : Constitutional\par o Appearance : well-nourished, well developed, alert, in no acute distress \par Head and Face\par o Head :\par ¦ Inspection : atraumatic, normocephalic\par o Face :\par ¦ Inspection : no visible rash or discoloration\par Respiratory\par o Respiratory Effort: breathing unlabored \par Neurologic\par o Sensation : Normal sensation \par Psychiatric\par o Mood and Affect: mood normal, affect appropriate \par Lymphatic\par o Additional Nodes : No palpable lymph nodes present \par \par Cervical Spine\par o Inspection/Palpation :\par ¦ Inspection : alignment midline, normal degree of lordosis present\par ¦ Skin : normal appearance, no masses or tenderness, trachea midline\par ¦ Palpation : musculature is nontender to palpation\par o Range of Motion : arc of motion full in all planes, no crepitance or pain with ROM\par o Tests: Negative Spurling’s test \par \par Right Upper Extremity\par o Right Shoulder :\par ¦ Inspection/Palpation : no anterior capsular or biceps tenderness, no swelling\par ¦ Range of Motion : mild discomfort with full forward flexion, internal rotation restricted by about 2 vertebral levels, full external rotation without pain, full abduction without pain, no crepitance\par ¦ Strength :  forward elevation, supraspinatus and external rotation at 90° of abduction 4+/5, internal and external rotation 4+/5, adduction and abduction 4+/5, biceps/triceps 5/5\par ¦ Stability : no joint instability on provocative testing \par ¦ Tests: Hernandez negative, Neer positive, Chente negative, drop arm test negative, [place]'s test negative\par \par Left Upper Extremity\par o Left Shoulder :\par ¦ Inspection/Palpation : no tenderness, no swelling or deformities\par ¦ Range of Motion : full and painless in all planes, no crepitance\par ¦ Strength :  forward elevation, internal rotation 5/5, external rotation 5/5, external rotation at 90 degrees of abduction, supraspinatus, adduction and abduction, biceps/triceps, 5/5\par ¦ Stability : no joint instability on provocative testing\par ¦ Tests: Hernandez negative, Neer test negative, Chente test negative, drop arm test negative\par \par Right Lower Extremity\par o Buttock : no tenderness, swelling or deformities\par o Right Hip :\par ¦ Inspection/Palpation : no tenderness to palpation, no swelling or deformities, very tight hamstrings\par ¦ Range of Motion : full and painless in all planes, no crepitance\par ¦ Stability : joint stability intact\par ¦ Strength : extension, flexion 4-/5, adduction, abduction, internal rotation and external rotation, hamstring 4-/5\par ¦ Tests: Cory’s test negative \par \par Gait and Station:\par Gait: gait normal, no significant extremity swelling or lymphedema, good proprioception and balance, equal leg lengths

## 2021-06-30 NOTE — REASON FOR VISIT
[Follow-Up Visit] : a follow-up visit for [Shoulder Pain] : shoulder pain [FreeTextEntry2] : right hip pain

## 2021-06-30 NOTE — DISCUSSION/SUMMARY
[de-identified] : I went over the pathophysiology of the patient's symptoms in great detail with the patient. I went over the patient's MRI with them in great detail and used models to aid in my explanation. We discussed the use of ice, Tylenol and anti-inflammatories to relieve pain. At-home strengthening, and stretching exercises were discussed and demonstrated with the patient. He should focus on light weight and high repetition exercises. He may play pickleball and was instructed to ice his shoulder after he plays. At this time, he will start a course of physical therapy for strengthening and flexibility of the right hip and hamstring. A prescription for physical therapy was provided. All of his questions were answered. He understands and consents to the plan. \par \par FU 1 month.\par after undergoing PT for his right hip and hamstring.

## 2021-06-30 NOTE — ADDENDUM
[FreeTextEntry1] : I, Neal Wheat, acted solely as a scribe for Dr. Lalito Rojas on this date 06/30/2021.\par All medical record entries made by the Scribe were at my, Dr. Lalito Rojas, direction and personally dictated by me on 06/30/2021. I have reviewed the chart and agree that the record accurately reflects my personal performance of the history, physical exam, assessment and plan. I have also personally directed, reviewed, and agreed with the chart.

## 2021-07-28 ENCOUNTER — APPOINTMENT (OUTPATIENT)
Dept: ORTHOPEDIC SURGERY | Facility: CLINIC | Age: 76
End: 2021-07-28
Payer: MEDICARE

## 2021-07-28 VITALS — WEIGHT: 190 LBS | HEIGHT: 71 IN | BODY MASS INDEX: 26.6 KG/M2

## 2021-07-28 DIAGNOSIS — S76.399A OTHER SPECIFIED INJURY OF MUSCLE, FASCIA AND TENDON OF THE POSTERIOR MUSCLE GROUP AT THIGH LEVEL, UNSPECIFIED THIGH, INITIAL ENCOUNTER: ICD-10-CM

## 2021-07-28 DIAGNOSIS — M75.41 IMPINGEMENT SYNDROME OF RIGHT SHOULDER: ICD-10-CM

## 2021-07-28 PROCEDURE — 20611 DRAIN/INJ JOINT/BURSA W/US: CPT | Mod: RT

## 2021-07-28 PROCEDURE — 99214 OFFICE O/P EST MOD 30 MIN: CPT | Mod: 25

## 2021-07-28 NOTE — HISTORY OF PRESENT ILLNESS
[de-identified] : 76 year old male presents complaining of right shoulder pain. He saw an Orthopedist in [place] ~3 months ago who gave him what sounds like a subacromial cortisone injection. It did not help him very much. He feels that his motion is basically full, but notes pain with quick motions and internal rotation. He is also complaining of lateral thigh and hamstrings pain. He denies low back pain. He is going to PT for both his shoulder and hip. 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 His shoulder is not improving and he wants to discuss a cortisone injection.\par \par MRI of the Right Shoulder taken at [facility] on 6/21/2021:\par 1.  No rotator cuff tear.\par 2.  Tendinosis of the supraspinatus, infraspinatus, and long head of biceps tendons.\par 3.  Focal thickening and increased signal within the inferior glenohumeral ligament with mild edema in the adjacent soft tissues, a finding which can be seen in adhesive capsulitis.\par 4.  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

## 2021-07-28 NOTE — PHYSICAL EXAM
[de-identified] : Constitutional\par o Appearance : well-nourished, well developed, alert, in no acute distress \par Head and Face\par o Head :\par ¦ Inspection : atraumatic, normocephalic\par o Face :\par ¦ Inspection : no visible rash or discoloration\par Respiratory\par o Respiratory Effort: breathing unlabored \par Neurologic\par o Sensation : Normal sensation \par Psychiatric\par o Mood and Affect: mood normal, affect appropriate \par Lymphatic\par o Additional Nodes : No palpable lymph nodes present \par \par Cervical Spine\par o Inspection/Palpation :\par ¦ Inspection : alignment midline, normal degree of lordosis present\par ¦ Skin : normal appearance, no masses or tenderness, trachea midline\par ¦ Palpation : musculature is nontender to palpation\par o Range of Motion : arc of motion full in all planes, no crepitance or pain with ROM\par o Tests: Negative Spurling’s test \par \par Right Upper Extremity\par o Right Shoulder :\par ¦ Inspection/Palpation : anterior capsular biceps tenderness, no swelling\par ¦ Range of Motion : discomfort with full forward flexion, limited internal rotation wit discomfort, no pain with full external rotation, full abduction without pain, no crepitance\par ¦ Strength :  forward elevation, supraspinatus and external rotation at 90° of abduction 5/5, internal and external rotation 5/5, adduction and abduction 5/5, biceps/triceps 5/5\par ¦ Stability : no joint instability on provocative testing \par ¦ Tests: Hernandez negative, Neer positive, Chente negative, drop arm test negative, [place]'s test negative\par \par Left Upper Extremity\par o Left Shoulder :\par ¦ Inspection/Palpation : no tenderness, no swelling or deformities\par ¦ Range of Motion : full and painless in all planes, no crepitance\par ¦ Strength :  forward elevation, internal rotation 5/5, external rotation 5/5, external rotation at 90 degrees of abduction, supraspinatus, adduction and abduction, biceps/triceps, 5/5\par ¦ Stability : no joint instability on provocative testing\par ¦ Tests: Hernandez negative, Neer test negative, Chente test negative, drop arm test negative\par \par Right Lower Extremity\par o Buttock : no tenderness, swelling or deformities\par o Right Hip :\par ¦ Inspection/Palpation : no ITB tenderness to palpation, no swelling or deformities\par ¦ Range of Motion : full and painless in all planes, no crepitance\par ¦ Stability : joint stability intact\par ¦ Strength : extension, flexion 5/5, adduction, abduction 5/5, internal rotation and external rotation\par ¦ Tests: Cory’s test negative \par \par o Right Knee :\par ¦ Inspection/Palpation : no tenderness to palpation, no swelling\par ¦ Range of Motion : active flexion and extension full and painless, no crepitance\par ¦ Stability : no valgus or varus instability present on provocative testing\par ¦ Strength : flexion and extension 5/5\par ¦ Tests and Signs : negative Anterior Drawer, negative Lachman, negative Mark \par \par Gait and Station:\par Gait: gait normal, no significant extremity swelling or lymphedema, good proprioception and balance, equal leg lengths\par \par o Right Shoulder: Indication- shoulder arthritis, Anatomic location- right intraarticular glenohumeral joint, Spray-area was sterilized with Betadine and alcohol and anesthetized with Ethyl Chloride , Needle used - 20G, Medications given- 5cc's lidocaine, 0.5cc's kenalog, 0.5cc's dexamethasone under Ultrasound guidance, and patient tolerated it well. He demonstrated improvement in ROM and pain immediately after the injection.

## 2021-07-28 NOTE — ADDENDUM
[FreeTextEntry1] : I, Neal Wheat, acted solely as a scribe for Dr. Lalito Rojas on this date 07/28/2021.\par All medical record entries made by the Scribe were at my, Dr. Lalito Rojas, direction and personally dictated by me on 07/28/2021. I have reviewed the chart and agree that the record accurately reflects my personal performance of the history, physical exam, assessment and plan. I have also personally directed, reviewed, and agreed with the chart.

## 2021-07-28 NOTE — DISCUSSION/SUMMARY
[de-identified] : I went over the pathophysiology of the patient's symptoms in great detail with the patient. The patient elected to receive a cortisone injection into his right shoulder today, and tolerated it well. I instructed the patient on ROM exercises, and told them to take it easy. The use of ice and rest was reviewed with the patient. The patient may resume activities tomorrow. The possibility of a right shoulder arthroscopy was discussed if this injection does not work. I am recommending the patient continue going to physical therapy to obtain increases in their strength and mobility. A prescription for PT was provided. All of his questions were answered. He understands and consents to the plan.\par \par FU in 4-6 weeks.\par after a right shoulder intraarticular cortisone injection today (07/28/2021).\par after continuing PT for his right shoulder.

## 2021-07-28 NOTE — REASON FOR VISIT
[Follow-Up Visit] : a follow-up visit for [Shoulder Pain] : shoulder pain [FreeTextEntry2] : right leg pain

## 2021-08-26 ENCOUNTER — APPOINTMENT (OUTPATIENT)
Dept: ORTHOPEDIC SURGERY | Facility: CLINIC | Age: 76
End: 2021-08-26
Payer: MEDICARE

## 2021-08-26 VITALS
WEIGHT: 191 LBS | BODY MASS INDEX: 26.74 KG/M2 | SYSTOLIC BLOOD PRESSURE: 145 MMHG | DIASTOLIC BLOOD PRESSURE: 76 MMHG | HEART RATE: 69 BPM | HEIGHT: 71 IN

## 2021-08-26 DIAGNOSIS — M54.5 LOW BACK PAIN: ICD-10-CM

## 2021-08-26 DIAGNOSIS — M51.36 OTHER INTERVERTEBRAL DISC DEGENERATION, LUMBAR REGION: ICD-10-CM

## 2021-08-26 DIAGNOSIS — M79.10 MYALGIA, UNSPECIFIED SITE: ICD-10-CM

## 2021-08-26 DIAGNOSIS — M60.9 MYOSITIS, UNSPECIFIED: ICD-10-CM

## 2021-08-26 PROCEDURE — 20552 NJX 1/MLT TRIGGER POINT 1/2: CPT

## 2021-08-26 PROCEDURE — 72100 X-RAY EXAM L-S SPINE 2/3 VWS: CPT

## 2021-08-26 PROCEDURE — 99204 OFFICE O/P NEW MOD 45 MIN: CPT

## 2021-09-01 ENCOUNTER — APPOINTMENT (OUTPATIENT)
Dept: ORTHOPEDIC SURGERY | Facility: CLINIC | Age: 76
End: 2021-09-01

## 2021-09-13 ENCOUNTER — APPOINTMENT (OUTPATIENT)
Dept: ORTHOPEDIC SURGERY | Facility: CLINIC | Age: 76
End: 2021-09-13
Payer: MEDICARE

## 2021-09-13 DIAGNOSIS — S80.02XA CONTUSION OF LEFT KNEE, INITIAL ENCOUNTER: ICD-10-CM

## 2021-09-13 PROCEDURE — 99213 OFFICE O/P EST LOW 20 MIN: CPT

## 2021-09-13 PROCEDURE — 73562 X-RAY EXAM OF KNEE 3: CPT | Mod: 50

## 2021-09-13 NOTE — HISTORY OF PRESENT ILLNESS
[de-identified] : 76 year old male presents s/p bilateral TKR, left knee done 8/22/18 and right knee done 8/22/17. He notes recently his left knee collapsed on him and he fell to the ground. He was at the hospital with his wife who had surgery and he walked about 15 blocks. He was unable to get up without assistance. He was able to walk slowly and then sat down. He has not had another buckling episode since.

## 2021-09-13 NOTE — DISCUSSION/SUMMARY
[de-identified] : I discussed the underlying pathophysiology of the patient's condition in great detail with the patient. I went over the patient's x-rays with them in great detail. I advised him that nothing looks wrong with his knee replacements and he has no instability. We discussed possibly starting a course of PT but he wants to hold off. I advised the patient that he no longer needs to take prophylactic antibiotics when undergoing any dental work. He should follow up on a yearly basis for his knee replacements. All of his questions were answered. He understands and consents to the plan.\par \par FU 1 year.

## 2021-09-13 NOTE — ADDENDUM
[FreeTextEntry1] : I, Neal Wheat, acted solely as a scribe for Dr. Lalito Rojas on this date 09/13/2021.\par All medical record entries made by the Scribe were at my, Dr. Lalito Rojas, direction and personally dictated by me on 09/13/2021. I have reviewed the chart and agree that the record accurately reflects my personal performance of the history, physical exam, assessment and plan. I have also personally directed, reviewed, and agreed with the chart.

## 2021-09-13 NOTE — PHYSICAL EXAM
[LE] : Sensory: Intact in bilateral lower extremities [de-identified] : Constitutional\par o Appearance : well-nourished, well developed, alert, in no acute distress \par Head and Face\par o Head :\par ¦ Inspection : atraumatic, normocephalic\par o Face :\par ¦ Inspection : no visible rash or discoloration\par Respiratory\par o Respiratory Effort: breathing unlabored \par Neurologic\par o Sensation : Normal sensation \par Psychiatric\par o Mood and Affect: mood normal, affect appropriate \par Lymphatic\par o Additional Nodes : No palpable lymph nodes present \par \par Right Lower Extremity\par o Buttock : no tenderness, swelling or deformities \par o Right Hip :\par ¦ Inspection/Palpation : no tenderness, swelling or deformities\par ¦ Range of Motion : full and painless in all planes, no crepitance\par ¦ Stability : joint stability intact\par ¦ Strength : extension, flexion, adduction, abduction, internal rotation and external rotation 5/5 \par \par o Right Knee :\par ¦ Inspection/Palpation : no tenderness to palpation, no swelling, incision well-healed\par ¦ Range of Motion : 0-130° painless, no crepitance, good patellofemoral glide \par ¦ Stability : no valgus or varus instability present on provocative testing\par ¦ Strength : flexion and extension 5/5\par ¦ Tests and Signs : negative anterior Drawer\par \par Left Lower Extremity\par o Buttock : no tenderness, swelling or deformities \par o Left Hip : active flexion and extension full and painless, no crepitance\par ¦ Inspection/Palpation : no tenderness, no swelling or deformities\par ¦ Range of Motion : \par ¦ Stability : joint stability intact\par ¦ Strength : extension, flexion, adduction, abduction, internal rotation and external rotation 5/5\par \par o Left Knee :\par ¦ Inspection/Palpation : no tenderness to palpation, no swelling, well-healed abrasion, no signs of infection, incision well-healed\par ¦ Range of Motion : 0-130° painless, no crepitance, good patellofemoral glide \par ¦ Stability : no valgus or varus instability present on provocative testing\par ¦ Strength : flexion and extension 5/5\par ¦ Tests and Signs : negative anterior Drawer, no apprehension with patellofemoral or lateral patella motion \par \par Gait and Station\par o Gait: normal gait, heel/toe, no significant extremity swelling or lymphedema, good proprioception and balance, leg lengths equal, no drop foot\par \par Radiology Results \par o Right Knee : Standing AP,lateral and skyline views of the knee were obtained and revealed excellent position of the total knee replacement with central tracking of the patella. No sign of loosening.\par o Left Knee: Standing AP, lateral and skyline views of the knee were obtained and revealed excellent position of his left total knee replacement with central tracking of the patella. No sign of loosening.

## 2021-09-17 ENCOUNTER — OUTPATIENT (OUTPATIENT)
Dept: OUTPATIENT SERVICES | Facility: HOSPITAL | Age: 76
LOS: 1 days | End: 2021-09-17
Payer: MEDICARE

## 2021-09-17 ENCOUNTER — APPOINTMENT (OUTPATIENT)
Dept: MRI IMAGING | Facility: CLINIC | Age: 76
End: 2021-09-17
Payer: MEDICARE

## 2021-09-17 DIAGNOSIS — Z90.89 ACQUIRED ABSENCE OF OTHER ORGANS: Chronic | ICD-10-CM

## 2021-09-17 DIAGNOSIS — Z96.651 PRESENCE OF RIGHT ARTIFICIAL KNEE JOINT: Chronic | ICD-10-CM

## 2021-09-17 DIAGNOSIS — M54.5 LOW BACK PAIN: ICD-10-CM

## 2021-09-17 DIAGNOSIS — Z98.890 OTHER SPECIFIED POSTPROCEDURAL STATES: Chronic | ICD-10-CM

## 2021-09-17 PROCEDURE — 72148 MRI LUMBAR SPINE W/O DYE: CPT | Mod: 26,MH

## 2021-09-17 PROCEDURE — 72148 MRI LUMBAR SPINE W/O DYE: CPT

## 2021-09-23 ENCOUNTER — APPOINTMENT (OUTPATIENT)
Dept: ORTHOPEDIC SURGERY | Facility: CLINIC | Age: 76
End: 2021-09-23
Payer: MEDICARE

## 2021-09-23 VITALS
HEART RATE: 80 BPM | WEIGHT: 191 LBS | DIASTOLIC BLOOD PRESSURE: 75 MMHG | BODY MASS INDEX: 26.74 KG/M2 | HEIGHT: 71 IN | SYSTOLIC BLOOD PRESSURE: 129 MMHG

## 2021-09-23 PROCEDURE — 99214 OFFICE O/P EST MOD 30 MIN: CPT

## 2021-09-23 RX ORDER — METHYLPREDNISOLONE 4 MG/1
4 TABLET ORAL
Qty: 1 | Refills: 1 | Status: ACTIVE | COMMUNITY
Start: 2021-09-23 | End: 1900-01-01

## 2021-09-29 NOTE — H&P PST ADULT - BP NONINVASIVE SYSTOLIC (MM HG)
What Type Of Note Output Would You Prefer (Optional)?: Standard Output
What Is The Reason For Today's Visit?: Full Body Skin Examination
What Is The Reason For Today's Visit? (Being Monitored For X): the development of new lesions
150

## 2021-10-28 ENCOUNTER — APPOINTMENT (OUTPATIENT)
Dept: ORTHOPEDIC SURGERY | Facility: CLINIC | Age: 76
End: 2021-10-28
Payer: MEDICARE

## 2021-10-28 VITALS
HEIGHT: 71 IN | HEART RATE: 84 BPM | DIASTOLIC BLOOD PRESSURE: 76 MMHG | WEIGHT: 191 LBS | SYSTOLIC BLOOD PRESSURE: 157 MMHG | BODY MASS INDEX: 26.74 KG/M2

## 2021-10-28 DIAGNOSIS — M48.07 SPINAL STENOSIS, LUMBOSACRAL REGION: ICD-10-CM

## 2021-10-28 PROCEDURE — 99214 OFFICE O/P EST MOD 30 MIN: CPT

## 2021-10-28 RX ORDER — METHYLPREDNISOLONE 4 MG/1
4 TABLET ORAL
Qty: 2 | Refills: 2 | Status: ACTIVE | COMMUNITY
Start: 2021-10-28 | End: 1900-01-01

## 2022-08-11 ENCOUNTER — APPOINTMENT (OUTPATIENT)
Dept: ORTHOPEDIC SURGERY | Facility: CLINIC | Age: 77
End: 2022-08-11

## 2022-08-11 PROCEDURE — 99213 OFFICE O/P EST LOW 20 MIN: CPT

## 2022-08-11 PROCEDURE — 73562 X-RAY EXAM OF KNEE 3: CPT | Mod: 50

## 2022-08-11 NOTE — ADDENDUM
[FreeTextEntry1] : I, Neal Wheat, acted solely as a scribe for Dr. Lalito Rojas on this date 08/11/2022.\par All medical record entries made by the Scribe were at my, Dr. Lalito Rojas, direction and personally dictated by me on 08/11/2022. I have reviewed the chart and agree that the record accurately reflects my personal performance of the history, physical exam, assessment and plan. I have also personally directed, reviewed, and agreed with the chart.

## 2022-08-11 NOTE — HISTORY OF PRESENT ILLNESS
[de-identified] : 77 year old male presents complaining of right leg buckling. He denies injury. He is s/p bilateral TKR (R 8/22/17, L 8/22/18). He notes a buckling episode about 11 months ago and again about 2.5 weeks ago. They both occurred after he did a lot of walking. He feels a "tightening" in the right knee that is different than the left knee. He notes no swelling. Of note, he lives in [place] for half the year and is going back in a month.

## 2022-08-11 NOTE — PHYSICAL EXAM
[LE] : Sensory: Intact in bilateral lower extremities [de-identified] : Constitutional\par o Appearance : well-nourished, well developed, alert, in no acute distress \par Head and Face\par o Head :\par ¦ Inspection : atraumatic, normocephalic\par o Face :\par ¦ Inspection : no visible rash or discoloration\par Respiratory\par o Respiratory Effort: breathing unlabored \par Neurologic\par o Sensation : Normal sensation \par Psychiatric\par o Mood and Affect: mood normal, affect appropriate \par Lymphatic\par o Additional Nodes : No palpable lymph nodes present \par \par Right Lower Extremity\par o Buttock : no tenderness, swelling or deformities \par o Right Hip :\par ¦ Inspection/Palpation : no tenderness, swelling or deformities\par ¦ Range of Motion : full and painless in all planes, no crepitance\par ¦ Stability : joint stability intact\par ¦ Strength : extension, flexion, adduction, abduction, internal rotation and external rotation, 4-/5 \par \par o Right Knee :\par ¦ Inspection/Palpation : no tenderness to palpation, no swelling, incision well-healed, central tracking of the patella\par ¦ Range of Motion : 0-130° painless, no crepitance, good patellofemoral glide \par ¦ Stability : no valgus or varus instability present on provocative testing\par ¦ Strength : flexion and extension 4-/5\par ¦ Tests and Signs : negative anterior Drawer\par \par Left Lower Extremity\par o Buttock : no tenderness, swelling or deformities \par o Left Hip : active flexion and extension full and painless, no crepitance\par ¦ Inspection/Palpation : no tenderness, no swelling or deformities\par ¦ Range of Motion : \par ¦ Stability : joint stability intact\par ¦ Strength : extension, flexion, adduction, abduction, internal rotation and external rotation, 5/5\par \par o Left Knee :\par ¦ Inspection/Palpation : no tenderness to palpation, no swelling, well-healed abrasion, incision well-healed, central tracking of the patella\par ¦ Range of Motion : 0-130° painless, no crepitance, good patellofemoral glide \par ¦ Stability : no valgus or varus instability present on provocative testing\par ¦ Strength : flexion and extension 5/5\par ¦ Tests and Signs : negative anterior Drawer, no apprehension with patellofemoral or lateral patella motion \par \par Gait and Station\par o Gait: normal gait, heel/toe, no significant extremity swelling or lymphedema, good proprioception and balance, leg lengths equal, no drop foot\par \par Radiology Results \par o Right Knee : Standing AP,lateral and skyline views of the knee were obtained and revealed excellent position of the right total knee replacement with central tracking of the patella. No sign of loosening or subsidence. \par o Left Knee: Standing AP, lateral and skyline views of the knee were obtained and revealed excellent position of his left total knee replacement with central tracking of the patella. No sign of loosening or subsidence.

## 2022-08-11 NOTE — DISCUSSION/SUMMARY
[de-identified] : I discussed the underlying pathophysiology of the patient's condition in great detail with the patient. I went over the patient's x-rays with them in great detail. At this time, he will start a course of physical therapy to strengthen the right hip and knee. A prescription was provided. I would like to see the patient back in the office in 1 month to reassess his condition. All of their questions were answered. They understand and consent to the plan.\par \par FU in 1 month (before he goes back to [place])\par after undergoing PT for his right knee.

## 2022-08-11 NOTE — REASON FOR VISIT
[Follow-Up Visit] : a follow-up visit for [Knee Pain] : knee pain [FreeTextEntry2] : s/p bilateral TKR

## 2022-09-29 ENCOUNTER — APPOINTMENT (OUTPATIENT)
Dept: ORTHOPEDIC SURGERY | Facility: CLINIC | Age: 77
End: 2022-09-29

## 2022-09-29 DIAGNOSIS — M19.011 PRIMARY OSTEOARTHRITIS, RIGHT SHOULDER: ICD-10-CM

## 2022-09-29 DIAGNOSIS — M47.812 SPONDYLOSIS W/OUT MYELOPATHY OR RADICULOPATHY, CERVICAL REGION: ICD-10-CM

## 2022-09-29 DIAGNOSIS — M17.10 UNILATERAL PRIMARY OSTEOARTHRITIS, UNSPECIFIED KNEE: ICD-10-CM

## 2022-09-29 DIAGNOSIS — M76.891 OTHER SPECIFIED ENTHESOPATHIES OF RIGHT LOWER LIMB, EXCLUDING FOOT: ICD-10-CM

## 2022-09-29 PROCEDURE — 99214 OFFICE O/P EST MOD 30 MIN: CPT

## 2022-09-29 NOTE — ADDENDUM
[FreeTextEntry1] : I, Neal Wheat, acted solely as a scribe for Dr. Lalito Rojas on this date 09/29/2022.\par \par All medical record entries made by the Scribe were at my, Dr. Lalito Rojas, direction and personally dictated by me on 09/29/2022. I have reviewed the chart and agree that the record accurately reflects my personal performance of the history, physical exam, assessment and plan. I have also personally directed, reviewed, and agreed with the chart.

## 2022-09-29 NOTE — DISCUSSION/SUMMARY
[de-identified] : I went over the pathophysiology of the patient's symptoms in great detail with them. I encouraged him to continue his exercises while in [place]. I suggested activities like walking, biking, and light weightlifting. We went over exercises for his shoulder using bands. I provided him with a blue resistance band and exercise sheets for the shoulders and neck. I would like to see the patient back in the office on a PRN basis to reassess his condition. All of their questions were answered. They understand and consent to the plan.\par \par FU PRN.\par after following a diligent HEP.

## 2022-09-29 NOTE — PHYSICAL EXAM
[LE] : Sensory: Intact in bilateral lower extremities [de-identified] : Constitutional\par o Appearance : well-nourished, well developed, alert, in no acute distress \par Head and Face\par o Head :\par ¦ Inspection : atraumatic, normocephalic\par o Face :\par ¦ Inspection : no visible rash or discoloration\par Respiratory\par o Respiratory Effort: breathing unlabored \par Neurologic\par o Sensation : Normal sensation \par Psychiatric\par o Mood and Affect: mood normal, affect appropriate \par Lymphatic\par o Additional Nodes : No palpable lymph nodes present \par \par Right Lower Extremity\par o Buttock : no tenderness, swelling or deformities \par o Right Hip :\par ¦ Inspection/Palpation : no tenderness, swelling or deformities\par ¦ Range of Motion : full and painless in all planes, no crepitance\par ¦ Stability : joint stability intact\par ¦ Strength : extension, flexion, adduction, abduction, internal rotation and external rotation, 5/5 \par \par o Right Knee :\par ¦ Inspection/Palpation : no tenderness to palpation, no swelling, incision well-healed, central tracking of the patella\par ¦ Range of Motion : 0-135° painless, no crepitance, good patellofemoral glide \par ¦ Stability : no valgus or varus instability present on provocative testing\par ¦ Strength : flexion and extension 5/5\par ¦ Tests and Signs : negative anterior Drawer\par \par Left Lower Extremity\par o Buttock : no tenderness, swelling or deformities \par o Left Hip : active flexion and extension full and painless, no crepitance\par ¦ Inspection/Palpation : no tenderness, no swelling or deformities\par ¦ Range of Motion : full flexion and rotation \par ¦ Stability : joint stability intact\par ¦ Strength : extension, flexion, adduction, abduction, internal rotation and external rotation, 5/5\par \par o Left Knee :\par ¦ Inspection/Palpation : no tenderness to palpation, no swelling, well-healed abrasion, incision well-healed, central tracking of the patella\par ¦ Range of Motion : 0-135° painless, no crepitance, good patellofemoral glide \par ¦ Stability : no valgus or varus instability present on provocative testing\par ¦ Strength : flexion and extension 5/5\par ¦ Tests and Signs : negative anterior Drawer, no apprehension with patellofemoral or lateral patella motion \par \par Gait and Station\par o Gait: normal gait, heel/toe, no significant extremity swelling or lymphedema, good proprioception and balance, leg lengths equal, no drop foot

## 2022-09-29 NOTE — HISTORY OF PRESENT ILLNESS
[de-identified] : 77 year old male presents for follow-up evaluation. He is s/p bilateral TKR (R 8/22/17, L 8/22/18). He reported two buckling episodes of the right knee; about 12 months ago and again 9-10 weeks ago. He is attending PT and is doing much better. He denies swelling or another buckling episode. \par Of note, he lives in [place] for half the year and is going back soon.\par \par Radiology Results 8/11/2022:\par o Right Knee : Standing AP,lateral and skyline views of the knee were obtained and revealed excellent position of the right total knee replacement with central tracking of the patella. No sign of loosening or subsidence. \par o Left Knee: Standing AP, lateral and skyline views of the knee were obtained and revealed excellent position of his left total knee replacement with central tracking of the patella. No sign of loosening or subsidence.

## 2022-12-02 NOTE — OCCUPATIONAL THERAPY INITIAL EVALUATION ADULT - BED MOBILITY TRAINING, PT EVAL
DISPLAY PLAN FREE TEXT
Pt will perform bed mobility with supervision within 2-3 sessions.

## 2023-03-27 ENCOUNTER — APPOINTMENT (OUTPATIENT)
Dept: ORTHOPEDIC SURGERY | Facility: CLINIC | Age: 78
End: 2023-03-27
Payer: MEDICARE

## 2023-03-27 DIAGNOSIS — M19.029 PRIMARY OSTEOARTHRITIS, UNSPECIFIED ELBOW: ICD-10-CM

## 2023-03-27 DIAGNOSIS — M19.019 PRIMARY OSTEOARTHRITIS, UNSPECIFIED SHOULDER: ICD-10-CM

## 2023-03-27 PROCEDURE — 73070 X-RAY EXAM OF ELBOW: CPT | Mod: RT

## 2023-03-27 PROCEDURE — 73030 X-RAY EXAM OF SHOULDER: CPT | Mod: RT

## 2023-03-27 PROCEDURE — 99214 OFFICE O/P EST MOD 30 MIN: CPT

## 2023-07-02 NOTE — H&P PST ADULT - RESPIRATORY RATE (BREATHS/MIN)
Pt reports constipation, last BM was Friday.  No relief with miralax and metamucil.  Reports hard small stools with blood.  On eliquis.   
16

## 2024-03-04 NOTE — H&P PST ADULT - AS BP NONINV METHOD
[FreeTextEntry1] : Writer will review psychotherapy session in detail during supervision with community supervisor: Linda Connell, PhD
electronic

## 2024-04-15 ENCOUNTER — APPOINTMENT (OUTPATIENT)
Dept: ORTHOPEDIC SURGERY | Facility: CLINIC | Age: 79
End: 2024-04-15
Payer: MEDICARE

## 2024-04-15 VITALS — BODY MASS INDEX: 28 KG/M2 | HEIGHT: 71 IN | WEIGHT: 200 LBS

## 2024-04-15 DIAGNOSIS — S22.39XA FRACTURE OF ONE RIB, UNSPECIFIED SIDE, INITIAL ENCOUNTER FOR CLOSED FRACTURE: ICD-10-CM

## 2024-04-15 PROCEDURE — 99214 OFFICE O/P EST MOD 30 MIN: CPT

## 2024-04-15 PROCEDURE — 73110 X-RAY EXAM OF WRIST: CPT | Mod: LT

## 2024-04-15 PROCEDURE — 71100 X-RAY EXAM RIBS UNI 2 VIEWS: CPT | Mod: LT

## 2024-04-15 NOTE — DISCUSSION/SUMMARY
[de-identified] :  The underlying pathophysiology was reviewed with the patient. XR films were reviewed with the patient. Discussed at length the nature of the patient's condition.   Patient was advised to take OTC medications and topical analgesic for pain management.  Regarding the left wrist he was informed to not wear the brace to avoid any loss in ROM. He was informed to move the hand as much as possible to increase the ROM and strengthen it. The swelling will take 6 months for it to subside. He was informed to elevate the hand and pump the hand to bring the swelling down.   For the rib injury, he was informed to apply lidocaine patches or heating pads for the pain management. He was informed that the pain will take around 6 weeks to decrease and for the fracture to heal.   All questions answered, understanding verbalized. Patient in agreement with plan of care.   Patient was advised to follow up as needed.

## 2024-04-15 NOTE — END OF VISIT
[FreeTextEntry3] :  All medical record entries made by the Scribe were at my,  Dr. Jan Solis MD., direction and personally dictated by me on 04/15/2024. I have personally reviewed the chart and agree that the record accurately reflects my personal performance of the history, physical exam, assessment and plan.

## 2024-04-15 NOTE — ADDENDUM
[FreeTextEntry1] :  I, Liset Stevenson wrote this note acting as a scribe for Dr. Jan Solis on Apr 15, 2024.

## 2024-04-15 NOTE — PHYSICAL EXAM
[de-identified] : Patient is WDWN, alert, and in no acute distress. Breathing is unlabored. He is grossly oriented to person, place, and time.  He was accompanied by His wife today.  Left Wrist:   present with a brace tenderness is present edema is present limited ROM  sensation is intact [de-identified] :  AP, lateral and oblique views of the Left Wrist were obtained today and revealed left triquetral fracture.   AP, lateral and oblique views of the Left Ribs were obtained today and revealed left sixth rib fracture. .

## 2024-04-15 NOTE — HISTORY OF PRESENT ILLNESS
[de-identified] : Pt is a 80 y/o male with left triquetral fracture and left sixth rib fracture.  He fell while playing pickle ball in [place] in March 10th, 2024.  He was seen by an orthopedist there.  He is wearing a splint.  The last xrays were taken on 3/22/24.  His pain in the hand is improving.  He continues to have swelling in the hand.

## 2024-04-19 NOTE — HISTORY OF PRESENT ILLNESS
DETAILED ROUTING HISTORY OF                                                Telephone Encounter for Lana Phillips                         Created by: Oksana Castro LPN         on Thu Feb 22, 2024 10:12 AM       Routed by: Oksana Castro LPN         on Thu Feb 22, 2024 10:18 AM       Routed to: Clyde Gonzalez DO  Roseanne: Routine  on Thu Feb 22, 2024 10:18 AM    Opened by: Clyde Gonzalez DO               on Fri Feb 23, 2024  6:51 AM        Doned by: Clyde Gonzalez DO               on Fri Feb 23, 2024  6:52 AM                                       End of Report                                   
Pt admitted to hospital for chest pain, anxiety, sob, admitted last night, relative wanted you to know.  
[de-identified] : 76 year old male presents complaining of right shoulder pain that started about 3 months ago when he was playing pickleball. He denies a specific incident where he felt a popping or snapping. He saw an Orthopedist in [place] about 2 months ago who gave him what sounds like a subacromial cortisone injection. He does not think this injection helped very much. He states that his shoulder is doing okay. He feels that his motion is basically full, but notes pain with quick motions and internal rotation. He has not played pickleball since this incident. He presents today to review his MRI results. He is also complaining of pain down the lateral aspect of his right leg when he sits for more than 30 minutes. He notes the pain is alleviated by stretching. He denies any low back pain.\par \par MRI of the Right Shoulder taken at [facility] on 6/21/2021:\par 1.  No rotator cuff tear.\par 2.  Tendinosis of the supraspinatus, infraspinatus, and long head of biceps tendons.\par 3.  Focal thickening and increased signal within the inferior glenohumeral ligament with mild edema in the adjacent soft tissues, a finding which can be seen in adhesive capsulitis.\par 4.  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

## 2024-05-09 ENCOUNTER — APPOINTMENT (OUTPATIENT)
Dept: ORTHOPEDIC SURGERY | Facility: CLINIC | Age: 79
End: 2024-05-09
Payer: MEDICARE

## 2024-05-09 DIAGNOSIS — S62.113A DISPLACED FRACTURE OF TRIQUETRUM [CUNEIFORM] BONE, UNSPECIFIED WRIST, INITIAL ENCOUNTER FOR CLOSED FRACTURE: ICD-10-CM

## 2024-05-09 PROCEDURE — 99213 OFFICE O/P EST LOW 20 MIN: CPT

## 2024-05-09 NOTE — PHYSICAL EXAM
[de-identified] : Patient is WDWN, alert, and in no acute distress. Breathing is unlabored. He is grossly oriented to person, place, and time.  Left Wrist:   tenderness is present edema is present limited ROM  sensation is intact [de-identified] :  AP, lateral and oblique views of the Left Wrist were obtained today and revealed left triquetral fracture.   AP, lateral and oblique views of the Left Ribs were obtained today and revealed left sixth rib fracture.

## 2024-05-09 NOTE — END OF VISIT
[FreeTextEntry3] :  All medical record entries made by the Scribe were at my,  Dr. Jan Solis MD., direction and personally dictated by me on 05/09/2024. I have personally reviewed the chart and agree that the record accurately reflects my personal performance of the history, physical exam, assessment and plan.

## 2024-05-09 NOTE — DISCUSSION/SUMMARY
[de-identified] :  The underlying pathophysiology was reviewed with the patient. XR films were reviewed with the patient. Discussed at length the nature of the patient's condition.   Patient was advised to take OTC medications and topical analgesic for pain management.  Regarding the left wrist pain he was informed that it is inflammation and he was advised to rest it and try conservative measure of treatment for pain management.   All questions answered, understanding verbalized. Patient in agreement with plan of care.   Patient was advised to follow up as needed.

## 2024-05-09 NOTE — HISTORY OF PRESENT ILLNESS
[de-identified] : Pt is a 78 y/o male with left triquetral fracture and left sixth rib fracture.  He fell while playing pickle ball in [place] in March 10th, 2024.  He was seen by an orthopedist there.  He is wearing a splint.  The last xrays were taken on 3/22/24.  His pain in the hand is improving.  He continues to have swelling in the hand.

## 2024-05-09 NOTE — ADDENDUM
[FreeTextEntry1] :  I, Liset Stevenson wrote this note acting as a scribe for Dr. Jan Solis on May 09, 2024.

## 2024-11-18 ENCOUNTER — APPOINTMENT (OUTPATIENT)
Dept: ORTHOPEDIC SURGERY | Facility: CLINIC | Age: 79
End: 2024-11-18
Payer: MEDICARE

## 2024-11-18 VITALS — HEIGHT: 71 IN | BODY MASS INDEX: 27.3 KG/M2 | WEIGHT: 195 LBS

## 2024-11-18 DIAGNOSIS — M19.029 PRIMARY OSTEOARTHRITIS, UNSPECIFIED ELBOW: ICD-10-CM

## 2024-11-18 PROCEDURE — 99213 OFFICE O/P EST LOW 20 MIN: CPT

## 2024-11-18 PROCEDURE — 73070 X-RAY EXAM OF ELBOW: CPT | Mod: RT

## 2025-04-22 ENCOUNTER — OUTPATIENT (OUTPATIENT)
Dept: OUTPATIENT SERVICES | Facility: HOSPITAL | Age: 80
LOS: 1 days | End: 2025-04-22
Payer: MEDICARE

## 2025-04-22 ENCOUNTER — APPOINTMENT (OUTPATIENT)
Dept: CV DIAGNOSTICS | Facility: HOSPITAL | Age: 80
End: 2025-04-22

## 2025-04-22 ENCOUNTER — RESULT REVIEW (OUTPATIENT)
Age: 80
End: 2025-04-22

## 2025-04-22 DIAGNOSIS — Z98.890 OTHER SPECIFIED POSTPROCEDURAL STATES: Chronic | ICD-10-CM

## 2025-04-22 DIAGNOSIS — Z90.89 ACQUIRED ABSENCE OF OTHER ORGANS: Chronic | ICD-10-CM

## 2025-04-22 DIAGNOSIS — I10 ESSENTIAL (PRIMARY) HYPERTENSION: ICD-10-CM

## 2025-04-22 DIAGNOSIS — Z96.651 PRESENCE OF RIGHT ARTIFICIAL KNEE JOINT: Chronic | ICD-10-CM

## 2025-04-22 PROCEDURE — 78452 HT MUSCLE IMAGE SPECT MULT: CPT | Mod: 26

## 2025-04-22 PROCEDURE — 93016 CV STRESS TEST SUPVJ ONLY: CPT | Mod: GC

## 2025-04-22 PROCEDURE — 93018 CV STRESS TEST I&R ONLY: CPT | Mod: GC

## 2025-08-22 ENCOUNTER — TRANSCRIPTION ENCOUNTER (OUTPATIENT)
Age: 80
End: 2025-08-22